# Patient Record
Sex: FEMALE | Race: BLACK OR AFRICAN AMERICAN | NOT HISPANIC OR LATINO | Employment: FULL TIME | RURAL
[De-identification: names, ages, dates, MRNs, and addresses within clinical notes are randomized per-mention and may not be internally consistent; named-entity substitution may affect disease eponyms.]

---

## 2018-07-14 NOTE — PROGRESS NOTES
Subjective   Loren Sharif is a 22 y.o. female.     Problem List  1. Obesity BMI >30   2. Former smoker   3. Exercise induced asthma   4. 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 She also works at [employer] as a      FamilyHx  -mother - Anemia, celiac disease  -father - unknown  -siblings- both anemic., ITP     Pt is 23 yo female who I am seeing for the first time. She is here to establish. She has seen another PCP in [place]. ABout 2 weeks ago she went to [facility] 2 weeks ago for a sore throat/pharyngitis was given a Z carl for 5 days. She is just now getting over her symptoms. She has subjective fevers and stiffneck. She had some swollen lymph nodes.  She did report smoking for a few weeks and only smoked 2 cigarettes a day. The only medication she takes is albuterol inhaler for exercise induced asthma.  She is not on singulair. She also has allergic rhinitis and was taking zyrtec before but did not like side effects. She occasional alcohol drinker, she denies any illiicit drugs. No children. She currently works at [employer] as a manager. She has family history of anemia, ceilaic disease, ITP. Her BMI >30.  She has tried losing weight.  She is due for pap smear. She cannot recall if she had the Gardasil vaccination. Her last Tdap was in 2012     Obesity   This is a chronic problem. The problem occurs constantly. The problem has been unchanged. Pertinent negatives include no abdominal pain, anorexia, arthralgias, change in bowel habit, chest pain, chills, congestion, coughing, diaphoresis, fatigue, fever, headaches, joint swelling, myalgias, nausea, neck pain, numbness, rash, sore throat, swollen glands, urinary symptoms, vertigo, visual change, vomiting or weakness. Nothing aggravates the symptoms. She has tried nothing for the symptoms. The treatment  "provided no relief.          The following portions of the patient's history were reviewed and updated as appropriate: allergies, current medications, past family history, past medical history, past social history, past surgical history and problem list.  Patient Active Problem List   Diagnosis   • Annual physical exam   • General medical examination   • Obesity   • Encounter for screening for endocrine disorder   • Encounter for screening for diabetes mellitus   • Encounter for screening for cardiovascular disorders   • Allergic rhinitis   • Encounter for Papanicolaou smear of cervix   • Asthma, exercise induced   • Viral warts     No current outpatient prescriptions on file prior to visit.     No current facility-administered medications on file prior to visit.        Review of Systems   Constitutional: Negative.  Negative for chills, diaphoresis, fatigue and fever.   HENT: Negative.  Negative for congestion and sore throat.    Eyes: Negative.    Respiratory: Negative.  Negative for cough.    Cardiovascular: Negative.  Negative for chest pain.   Gastrointestinal: Negative.  Negative for abdominal pain, anorexia, change in bowel habit, nausea and vomiting.   Endocrine: Negative.    Genitourinary: Negative.    Musculoskeletal: Negative.  Negative for arthralgias, joint swelling, myalgias and neck pain.   Skin: Negative.  Negative for rash.   Allergic/Immunologic: Positive for environmental allergies.   Neurological: Negative.  Negative for vertigo, weakness and numbness.   Hematological: Negative.    Psychiatric/Behavioral: Negative.        Objective    /74   Pulse 67   Temp 99.5 °F (37.5 °C)   Ht 170.2 cm (67\")   Wt 103 kg (227 lb 9.6 oz)   LMP 07/06/2018   SpO2 98%   BMI 35.65 kg/m²     No results found for any previous visit.       Physical Exam   Constitutional: She is oriented to person, place, and time. She appears well-developed and well-nourished. No distress.   HENT:   Head: Normocephalic and " atraumatic.   Right Ear: External ear normal.   Left Ear: External ear normal.   Mild erythema on pharynx. Pt has tonsils.  No white patches    Eyes: Conjunctivae and EOM are normal. Pupils are equal, round, and reactive to light. Right eye exhibits no discharge. Left eye exhibits no discharge. No scleral icterus.   Neck: Normal range of motion. Neck supple. No JVD present. No tracheal deviation present. No thyromegaly present.   Cardiovascular: Normal rate, regular rhythm, normal heart sounds and intact distal pulses.    Pulmonary/Chest: Effort normal. No stridor. No respiratory distress. She has no wheezes.   Abdominal: Soft. She exhibits no distension and no mass. There is no tenderness. There is no guarding.   Obese abdomen    Musculoskeletal: Normal range of motion. She exhibits no edema, tenderness or deformity.        Hands:  Lymphadenopathy:     She has no cervical adenopathy.   Neurological: She is alert and oriented to person, place, and time. She displays normal reflexes. No cranial nerve deficit. Coordination normal.   Skin: Skin is warm and dry. No rash noted. She is not diaphoretic. No erythema.   Psychiatric: She has a normal mood and affect. Her behavior is normal.   Nursing note and vitals reviewed.        Assessment/Plan   Problems Addressed this Visit        Respiratory    Allergic rhinitis    Asthma, exercise induced    Relevant Medications    albuterol (PROVENTIL HFA;VENTOLIN HFA) 108 (90 Base) MCG/ACT inhaler    montelukast (SINGULAIR) 5 MG chewable tablet       Digestive    Obesity    Relevant Orders    Vitamin D 25 Hydroxy       Musculoskeletal and Integument    Viral warts    Relevant Medications    Salicylic Acid 27.5 % liquid       Other    Annual physical exam    General medical examination - Primary    Relevant Orders    CBC Auto Differential    Comprehensive Metabolic Panel    Hemoglobin A1c    Lipid Panel    TSH    T4, Free    T3, Free    Vitamin D 25 Hydroxy    Encounter for screening  for endocrine disorder    Relevant Orders    TSH    T4, Free    T3, Free    Encounter for screening for diabetes mellitus    Relevant Orders    Hemoglobin A1c    Encounter for screening for cardiovascular disorders    Relevant Orders    Lipid Panel    Encounter for Papanicolaou smear of cervix    Relevant Orders    Ambulatory Referral to Obstetrics / Gynecology        - will get labwork including cbc, cmp, lipid panel, thyroid studies and vitamin D  -gave information on gardasil vaccination.. She will decide on next visit  -referral to [facility] for pap smear. Consultation appreciated  -annual physical exam today  -for exercise induced asthma - refilled inhaler. singulair at bedtime   -allergic rhinitis-  flonase PRN along with singulair 5 mg at bedtime. Drug information provided  -for wart on right index finger. Salicylic acid to apply to area twice a day for 4-6 week.s drug information provided  -advised pt to be safe and call with any questions or concerns. All questions addressed today  -obesity -weight loss information provided  -recheck in 2 weeks        This document has been electronically signed by Sandeep Rahman MD on July 16, 2018 2:12 PM

## 2018-07-16 ENCOUNTER — OFFICE VISIT (OUTPATIENT)
Dept: FAMILY MEDICINE CLINIC | Facility: CLINIC | Age: 23
End: 2018-07-16

## 2018-07-16 VITALS
DIASTOLIC BLOOD PRESSURE: 74 MMHG | BODY MASS INDEX: 35.72 KG/M2 | HEART RATE: 67 BPM | OXYGEN SATURATION: 98 % | TEMPERATURE: 99.5 F | SYSTOLIC BLOOD PRESSURE: 120 MMHG | HEIGHT: 67 IN | WEIGHT: 227.6 LBS

## 2018-07-16 DIAGNOSIS — Z13.6 ENCOUNTER FOR SCREENING FOR CARDIOVASCULAR DISORDERS: ICD-10-CM

## 2018-07-16 DIAGNOSIS — Z13.1 ENCOUNTER FOR SCREENING FOR DIABETES MELLITUS: ICD-10-CM

## 2018-07-16 DIAGNOSIS — E66.9 OBESITY, UNSPECIFIED CLASSIFICATION, UNSPECIFIED OBESITY TYPE, UNSPECIFIED WHETHER SERIOUS COMORBIDITY PRESENT: ICD-10-CM

## 2018-07-16 DIAGNOSIS — J30.9 ALLERGIC RHINITIS, UNSPECIFIED CHRONICITY, UNSPECIFIED SEASONALITY, UNSPECIFIED TRIGGER: ICD-10-CM

## 2018-07-16 DIAGNOSIS — Z12.4 ENCOUNTER FOR PAPANICOLAOU SMEAR OF CERVIX: ICD-10-CM

## 2018-07-16 DIAGNOSIS — B07.9 VIRAL WARTS, UNSPECIFIED TYPE: ICD-10-CM

## 2018-07-16 DIAGNOSIS — Z13.29 ENCOUNTER FOR SCREENING FOR ENDOCRINE DISORDER: ICD-10-CM

## 2018-07-16 DIAGNOSIS — J45.990 ASTHMA, EXERCISE INDUCED: ICD-10-CM

## 2018-07-16 DIAGNOSIS — Z00.00 GENERAL MEDICAL EXAMINATION: Primary | ICD-10-CM

## 2018-07-16 DIAGNOSIS — Z00.00 ANNUAL PHYSICAL EXAM: ICD-10-CM

## 2018-07-16 PROCEDURE — 99214 OFFICE O/P EST MOD 30 MIN: CPT | Performed by: FAMILY MEDICINE

## 2018-07-16 RX ORDER — FLUTICASONE PROPIONATE 50 MCG
2 SPRAY, SUSPENSION (ML) NASAL DAILY
Qty: 1 BOTTLE | Refills: 3 | Status: SHIPPED | OUTPATIENT
Start: 2018-07-16 | End: 2018-08-15

## 2018-07-16 RX ORDER — SALICYLIC ACID 275 MG/ML
1 SOLUTION TOPICAL 2 TIMES DAILY
Qty: 10 ML | Refills: 1 | Status: SHIPPED | OUTPATIENT
Start: 2018-07-16 | End: 2020-12-08

## 2018-07-16 RX ORDER — MONTELUKAST SODIUM 5 MG/1
5 TABLET, CHEWABLE ORAL NIGHTLY
Qty: 30 TABLET | Refills: 3 | Status: SHIPPED | OUTPATIENT
Start: 2018-07-16 | End: 2020-12-08

## 2018-07-16 RX ORDER — ALBUTEROL SULFATE 90 UG/1
2 AEROSOL, METERED RESPIRATORY (INHALATION) EVERY 4 HOURS
Qty: 1 INHALER | Refills: 3 | Status: SHIPPED | OUTPATIENT
Start: 2018-07-16 | End: 2020-12-08

## 2018-07-16 RX ORDER — ALBUTEROL SULFATE 90 UG/1
AEROSOL, METERED RESPIRATORY (INHALATION) EVERY 4 HOURS
COMMUNITY
End: 2018-07-16 | Stop reason: SDUPTHER

## 2018-07-16 NOTE — PATIENT INSTRUCTIONS
Exercising to Lose Weight  Exercising can help you to lose weight. In order to lose weight through exercise, you need to do vigorous-intensity exercise. You can tell that you are exercising with vigorous intensity if you are breathing very hard and fast and cannot hold a conversation while exercising.  Moderate-intensity exercise helps to maintain your current weight. You can tell that you are exercising at a moderate level if you have a higher heart rate and faster breathing, but you are still able to hold a conversation.  How often should I exercise?  Choose an activity that you enjoy and set realistic goals. Your health care provider can help you to make an activity plan that works for you. Exercise regularly as directed by your health care provider. This may include:  · Doing resistance training twice each week, such as:  ? Push-ups.  ? Sit-ups.  ? Lifting weights.  ? Using resistance bands.  · Doing a given intensity of exercise for a given amount of time. Choose from these options:  ? 150 minutes of moderate-intensity exercise every week.  ? 75 minutes of vigorous-intensity exercise every week.  ? A mix of moderate-intensity and vigorous-intensity exercise every week.    Children, pregnant women, people who are out of shape, people who are overweight, and older adults may need to consult a health care provider for individual recommendations. If you have any sort of medical condition, be sure to consult your health care provider before starting a new exercise program.  What are some activities that can help me to lose weight?  · Walking at a rate of at least 4.5 miles an hour.  · Jogging or running at a rate of 5 miles per hour.  · Biking at a rate of at least 10 miles per hour.  · Lap swimming.  · Roller-skating or in-line skating.  · Cross-country skiing.  · Vigorous competitive sports, such as football, basketball, and soccer.  · Jumping rope.  · Aerobic dancing.  How can I be more active in my day-to-day  activities?  · Use the stairs instead of the elevator.  · Take a walk during your lunch break.  · If you drive, park your car farther away from work or school.  · If you take public transportation, get off one stop early and walk the rest of the way.  · Make all of your phone calls while standing up and walking around.  · Get up, stretch, and walk around every 30 minutes throughout the day.  What guidelines should I follow while exercising?  · Do not exercise so much that you hurt yourself, feel dizzy, or get very short of breath.  · Consult your health care provider prior to starting a new exercise program.  · Wear comfortable clothes and shoes with good support.  · Drink plenty of water while you exercise to prevent dehydration or heat stroke. Body water is lost during exercise and must be replaced.  · Work out until you breathe faster and your heart beats faster.  This information is not intended to replace advice given to you by your health care provider. Make sure you discuss any questions you have with your health care provider.  Document Released: 01/20/2012 Document Revised: 05/25/2017 Document Reviewed: 05/21/2015  [employer] Interactive Patient Education © 2018 [employer]    Calorie Counting for Weight Loss  Calories are units of energy. Your body needs a certain amount of calories from food to keep you going throughout the day. When you eat more calories than your body needs, your body stores the extra calories as fat. When you eat fewer calories than your body needs, your body burns fat to get the energy it needs.  Calorie counting means keeping track of how many calories you eat and drink each day. Calorie counting can be helpful if you need to lose weight. If you make sure to eat fewer calories than your body needs, you should lose weight. Ask your health care provider what a healthy weight is for you.  For calorie counting to work, you will need to eat the right number of calories in a day in order  to lose a healthy amount of weight per week. A dietitian can help you determine how many calories you need in a day and will give you suggestions on how to reach your calorie goal.  · A healthy amount of weight to lose per week is usually 1-2 lb (0.5-0.9 kg). This usually means that your daily calorie intake should be reduced by 500-750 calories.  · Eating 1,200 - 1,500 calories per day can help most women lose weight.  · Eating 1,500 - 1,800 calories per day can help most men lose weight.    What is my plan?  My goal is to have __________ calories per day.  If I have this many calories per day, I should lose around __________ pounds per week.  What do I need to know about calorie counting?  In order to meet your daily calorie goal, you will need to:  · Find out how many calories are in each food you would like to eat. Try to do this before you eat.  · Decide how much of the food you plan to eat.  · Write down what you ate and how many calories it had. Doing this is called keeping a food log.    To successfully lose weight, it is important to balance calorie counting with a healthy lifestyle that includes regular activity. Aim for 150 minutes of moderate exercise (such as walking) or 75 minutes of vigorous exercise (such as running) each week.  Where do I find calorie information?    The number of calories in a food can be found on a Nutrition Facts label. If a food does not have a Nutrition Facts label, try to look up the calories online or ask your dietitian for help.  Remember that calories are listed per serving. If you choose to have more than one serving of a food, you will have to multiply the calories per serving by the amount of servings you plan to eat. For example, the label on a package of bread might say that a serving size is 1 slice and that there are 90 calories in a serving. If you eat 1 slice, you will have eaten 90 calories. If you eat 2 slices, you will have eaten 180 calories.  How do I keep a  "food log?  Immediately after each meal, record the following information in your food log:  · What you ate. Don't forget to include toppings, sauces, and other extras on the food.  · How much you ate. This can be measured in cups, ounces, or number of items.  · How many calories each food and drink had.  · The total number of calories in the meal.    Keep your food log near you, such as in a small notebook in your pocket, or use a mobile priyanka or website. Some programs will calculate calories for you and show you how many calories you have left for the day to meet your goal.  What are some calorie counting tips?  · Use your calories on foods and drinks that will fill you up and not leave you hungry:  ? Some examples of foods that fill you up are nuts and nut butters, vegetables, lean proteins, and high-fiber foods like whole grains. High-fiber foods are foods with more than 5 g fiber per serving.  ? Drinks such as sodas, specialty coffee drinks, alcohol, and juices have a lot of calories, yet do not fill you up.  · Eat nutritious foods and avoid empty calories. Empty calories are calories you get from foods or beverages that do not have many vitamins or protein, such as candy, sweets, and soda. It is better to have a nutritious high-calorie food (such as an avocado) than a food with few nutrients (such as a bag of chips).  · Know how many calories are in the foods you eat most often. This will help you calculate calorie counts faster.  · Pay attention to calories in drinks. Low-calorie drinks include water and unsweetened drinks.  · Pay attention to nutrition labels for \"low fat\" or \"fat free\" foods. These foods sometimes have the same amount of calories or more calories than the full fat versions. They also often have added sugar, starch, or salt, to make up for flavor that was removed with the fat.  · Find a way of tracking calories that works for you. Get creative. Try different apps or programs if writing down " calories does not work for you.  What are some portion control tips?  · Know how many calories are in a serving. This will help you know how many servings of a certain food you can have.  · Use a measuring cup to measure serving sizes. You could also try weighing out portions on a kitchen scale. With time, you will be able to estimate serving sizes for some foods.  · Take some time to put servings of different foods on your favorite plates, bowls, and cups so you know what a serving looks like.  · Try not to eat straight from a bag or box. Doing this can lead to overeating. Put the amount you would like to eat in a cup or on a plate to make sure you are eating the right portion.  · Use smaller plates, glasses, and bowls to prevent overeating.  · Try not to multitask (for example, watch TV or use your computer) while eating. If it is time to eat, sit down at a table and enjoy your food. This will help you to know when you are full. It will also help you to be aware of what you are eating and how much you are eating.  What are tips for following this plan?  Reading food labels  · Check the calorie count compared to the serving size. The serving size may be smaller than what you are used to eating.  · Check the source of the calories. Make sure the food you are eating is high in vitamins and protein and low in saturated and trans fats.  Shopping  · Read nutrition labels while you shop. This will help you make healthy decisions before you decide to purchase your food.  · Make a grocery list and stick to it.  Cooking  · Try to cook your favorite foods in a healthier way. For example, try baking instead of frying.  · Use low-fat dairy products.  Meal planning  · Use more fruits and vegetables. Half of your plate should be fruits and vegetables.  · Include lean proteins like poultry and fish.  How do I count calories when eating out?  · Ask for smaller portion sizes.  · Consider sharing an entree and sides instead of  getting your own entree.  · If you get your own entree, eat only half. Ask for a box at the beginning of your meal and put the rest of your entree in it so you are not tempted to eat it.  · If calories are listed on the menu, choose the lower calorie options.  · Choose dishes that include vegetables, fruits, whole grains, low-fat dairy products, and lean protein.  · Choose items that are boiled, broiled, grilled, or steamed. Stay away from items that are buttered, battered, fried, or served with cream sauce. Items labeled “crispy” are usually fried, unless stated otherwise.  · Choose water, low-fat milk, unsweetened iced tea, or other drinks without added sugar. If you want an alcoholic beverage, choose a lower calorie option such as a glass of wine or light beer.  · Ask for dressings, sauces, and syrups on the side. These are usually high in calories, so you should limit the amount you eat.  · If you want a salad, choose a garden salad and ask for grilled meats. Avoid extra toppings like jain, cheese, or fried items. Ask for the dressing on the side, or ask for olive oil and vinegar or lemon to use as dressing.  · Estimate how many servings of a food you are given. For example, a serving of cooked rice is ½ cup or about the size of half a baseball. Knowing serving sizes will help you be aware of how much food you are eating at restaurants. The list below tells you how big or small some common portion sizes are based on everyday objects:  ? 1 oz--4 stacked dice.  ? 3 oz--1 deck of cards.  ? 1 tsp--1 die.  ? 1 Tbsp--½ a ping-pong ball.  ? 2 Tbsp--1 ping-pong ball.  ? ½ cup--½ baseball.  ? 1 cup--1 baseball.  Summary  · Calorie counting means keeping track of how many calories you eat and drink each day. If you eat fewer calories than your body needs, you should lose weight.  · A healthy amount of weight to lose per week is usually 1-2 lb (0.5-0.9 kg). This usually means reducing your daily calorie intake by 500-750  calories.  · The number of calories in a food can be found on a Nutrition Facts label. If a food does not have a Nutrition Facts label, try to look up the calories online or ask your dietitian for help.  · Use your calories on foods and drinks that will fill you up, and not on foods and drinks that will leave you hungry.  · Use smaller plates, glasses, and bowls to prevent overeating.  This information is not intended to replace advice given to you by your health care provider. Make sure you discuss any questions you have with your health care provider.  Document Released: 12/18/2006 Document Revised: 11/17/2017 Document Reviewed: 11/17/2017  [employer] Interactive Patient Education © 2018 [employer]    Steps to Quit Smoking  Smoking tobacco can be harmful to your health and can affect almost every organ in your body. Smoking puts you, and those around you, at risk for developing many serious chronic diseases. Quitting smoking is difficult, but it is one of the best things that you can do for your health. It is never too late to quit.  What are the benefits of quitting smoking?  When you quit smoking, you lower your risk of developing serious diseases and conditions, such as:  · Lung cancer or lung disease, such as COPD.  · Heart disease.  · Stroke.  · Heart attack.  · Infertility.  · Osteoporosis and bone fractures.    Additionally, symptoms such as coughing, wheezing, and shortness of breath may get better when you quit. You may also find that you get sick less often because your body is stronger at fighting off colds and infections. If you are pregnant, quitting smoking can help to reduce your chances of having a baby of low birth weight.  How do I get ready to quit?  When you decide to quit smoking, create a plan to make sure that you are successful. Before you quit:  · Pick a date to quit. Set a date within the next two weeks to give you time to prepare.  · Write down the reasons why you are quitting. Keep  this list in places where you will see it often, such as on your bathroom mirror or in your car or wallet.  · Identify the people, places, things, and activities that make you want to smoke (triggers) and avoid them. Make sure to take these actions:  ? Throw away all cigarettes at home, at work, and in your car.  ? Throw away smoking accessories, such as ashtrays and lighters.  ? Clean your car and make sure to empty the ashtray.  ? Clean your home, including curtains and carpets.  · Tell your family, friends, and coworkers that you are quitting. Support from your loved ones can make quitting easier.  · Talk with your health care provider about your options for quitting smoking.  · Find out what treatment options are covered by your health insurance.    What strategies can I use to quit smoking?  Talk with your healthcare provider about different strategies to quit smoking. Some strategies include:  · Quitting smoking altogether instead of gradually lessening how much you smoke over a period of time. Research shows that quitting “cold turkey” is more successful than gradually quitting.  · Attending in-person counseling to help you build problem-solving skills. You are more likely to have success in quitting if you attend several counseling sessions. Even short sessions of 10 minutes can be effective.  · Finding resources and support systems that can help you to quit smoking and remain smoke-free after you quit. These resources are most helpful when you use them often. They can include:  ? Online chats with a counselor.  ? Telephone quitlines.  ? Printed self-help materials.  ? Support groups or group counseling.  ? Text messaging programs.  ? Mobile phone applications.  · Taking medicines to help you quit smoking. (If you are pregnant or breastfeeding, talk with your health care provider first.) Some medicines contain nicotine and some do not. Both types of medicines help with cravings, but the medicines that  include nicotine help to relieve withdrawal symptoms. Your health care provider may recommend:  ? Nicotine patches, gum, or lozenges.  ? Nicotine inhalers or sprays.  ? Non-nicotine medicine that is taken by mouth.    Talk with your health care provider about combining strategies, such as taking medicines while you are also receiving in-person counseling. Using these two strategies together makes you more likely to succeed in quitting than if you used either strategy on its own.  If you are pregnant or breastfeeding, talk with your health care provider about finding counseling or other support strategies to quit smoking. Do not take medicine to help you quit smoking unless told to do so by your health care provider.  What things can I do to make it easier to quit?  Quitting smoking might feel overwhelming at first, but there is a lot that you can do to make it easier. Take these important actions:  · Reach out to your family and friends and ask that they support and encourage you during this time. Call telephone quitlines, reach out to support groups, or work with a counselor for support.  · Ask people who smoke to avoid smoking around you.  · Avoid places that trigger you to smoke, such as bars, parties, or smoke-break areas at work.  · Spend time around people who do not smoke.  · Lessen stress in your life, because stress can be a smoking trigger for some people. To lessen stress, try:  ? Exercising regularly.  ? Deep-breathing exercises.  ? Yoga.  ? Meditating.  ? Performing a body scan. This involves closing your eyes, scanning your body from head to toe, and noticing which parts of your body are particularly tense. Purposefully relax the muscles in those areas.  · Download or purchase mobile phone or tablet apps (applications) that can help you stick to your quit plan by providing reminders, tips, and encouragement. There are many free apps, such as QuitGuide from the CDC (Centers for Disease Control and  Prevention). You can find other support for quitting smoking (smoking cessation) through smokefree.gov and other websites.    How will I feel when I quit smoking?  Within the first 24 hours of quitting smoking, you may start to feel some withdrawal symptoms. These symptoms are usually most noticeable 2-3 days after quitting, but they usually do not last beyond 2-3 weeks. Changes or symptoms that you might experience include:  · Mood swings.  · Restlessness, anxiety, or irritation.  · Difficulty concentrating.  · Dizziness.  · Strong cravings for sugary foods in addition to nicotine.  · Mild weight gain.  · Constipation.  · Nausea.  · Coughing or a sore throat.  · Changes in how your medicines work in your body.  · A depressed mood.  · Difficulty sleeping (insomnia).    After the first 2-3 weeks of quitting, you may start to notice more positive results, such as:  · Improved sense of smell and taste.  · Decreased coughing and sore throat.  · Slower heart rate.  · Lower blood pressure.  · Clearer skin.  · The ability to breathe more easily.  · Fewer sick days.    Quitting smoking is very challenging for most people. Do not get discouraged if you are not successful the first time. Some people need to make many attempts to quit before they achieve long-term success. Do your best to stick to your quit plan, and talk with your health care provider if you have any questions or concerns.  This information is not intended to replace advice given to you by your health care provider. Make sure you discuss any questions you have with your health care provider.  Document Released: 12/12/2002 Document Revised: 08/15/2017 Document Reviewed: 05/03/2016  [employer] Interactive Patient Education © 2018 [employer]  Human Papillomavirus Quadrivalent Vaccine suspension for injection  What is this medicine?  HUMAN PAPILLOMAVIRUS VACCINE (HYOO muhn pap [facility] vak SEEN) is a vaccine. It is used to prevent infections of four  types of the human papillomavirus. In women, the vaccine may lower your risk of getting cervical, vaginal, vulvar, or anal cancer and genital warts. In men, the vaccine may lower your risk of getting genital warts and anal cancer. You cannot get these diseases from the vaccine. This vaccine does not treat these diseases.  This medicine may be used for other purposes; ask your health care provider or pharmacist if you have questions.  COMMON BRAND NAME(S): Gardasil  What should I tell my health care provider before I take this medicine?  They need to know if you have any of these conditions:  -fever or infection  -hemophilia  -HIV infection or AIDS  -immune system problems  -low platelet count  -an unusual reaction to Human Papillomavirus Vaccine, yeast, other medicines, foods, dyes, or preservatives  -pregnant or trying to get pregnant  -breast-feeding  How should I use this medicine?  This vaccine is for injection in a muscle on your upper arm or thigh. It is given by a health care professional. You will be observed for 15 minutes after each dose. Sometimes, fainting happens after the vaccine is given. You may be asked to sit or lie down during the 15 minutes. Three doses are given. The second dose is given 2 months after the first dose. The last dose is given 4 months after the second dose.  A copy of a Vaccine Information Statement will be given before each vaccination. Read this sheet carefully each time. The sheet may change frequently.  Talk to your pediatrician regarding the use of this medicine in children. While this drug may be prescribed for children as young as 9 years of age for selected conditions, precautions do apply.  Overdosage: If you think you have taken too much of this medicine contact a poison control center or emergency room at once.  NOTE: This medicine is only for you. Do not share this medicine with others.  What if I miss a dose?  All 3 doses of the vaccine should be given within 6  months. Remember to keep appointments for follow-up doses. Your health care provider will tell you when to return for the next vaccine. Ask your health care professional for advice if you are unable to keep an appointment or miss a scheduled dose.  What may interact with this medicine?  -other vaccines  This list may not describe all possible interactions. Give your health care provider a list of all the medicines, herbs, non-prescription drugs, or dietary supplements you use. Also tell them if you smoke, drink alcohol, or use illegal drugs. Some items may interact with your medicine.  What should I watch for while using this medicine?  This vaccine may not fully protect everyone. Continue to have regular pelvic exams and cervical or anal cancer screenings as directed by your doctor.  The Human Papillomavirus is a sexually transmitted disease. It can be passed by any kind of sexual activity that involves genital contact. The vaccine works best when given before you have any contact with the virus. Many people who have the virus do not have any signs or symptoms.  Tell your doctor or health care professional if you have any reaction or unusual symptom after getting the vaccine.  What side effects may I notice from receiving this medicine?  Side effects that you should report to your doctor or health care professional as soon as possible:  -allergic reactions like skin rash, itching or hives, swelling of the face, lips, or tongue  -breathing problems  -feeling faint or lightheaded, falls  Side effects that usually do not require medical attention (report to your doctor or health care professional if they continue or are bothersome):  -cough  -dizziness  -fever  -headache  -nausea  -redness, warmth, swelling, pain, or itching at site where injected  This list may not describe all possible side effects. Call your doctor for medical advice about side effects. You may report side effects to FDA at 5-479-FDA-5748.  Where  should I keep my medicine?  This drug is given in a hospital or clinic and will not be stored at home.  NOTE: This sheet is a summary. It may not cover all possible information. If you have questions about this medicine, talk to your doctor, pharmacist, or health care provider.  © 2018 [employer] (2015-02-09 13:14:33)  HPV Vaccine Gardasil® (Human Papillomavirus): What You Need to Know  1. What is HPV?  Genital human papillomavirus (HPV) is the most common sexually transmitted virus in the [place]. More than half of sexually active men and women are infected with HPV at some time in their lives.  About 20 million Americans are currently infected, and about 6 million more get infected each year. HPV is usually spread through sexual contact.  Most HPV infections don't cause any symptoms, and go away on their own. But HPV can cause cervical cancer in women. Cervical cancer is the 2nd leading cause of cancer deaths among women around the world. In the [place], about 12,000 women get cervical cancer every year and about 4,000 are expected to die from it.  HPV is also associated with several less common cancers, such as vaginal and vulvar cancers in women, and anal and oropharyngeal (back of the throat, including base of tongue and tonsils) cancers in both men and women. HPV can also cause genital warts and warts in the throat.  There is no cure for HPV infection, but some of the problems it causes can be treated.  2. HPV vaccine: Why get vaccinated?  The HPV vaccine you are getting is one of two vaccines that can be given to prevent HPV. It may be given to both males and females.   This vaccine can prevent most cases of cervical cancer in females, if it is given before exposure to the virus. In addition, it can prevent vaginal and vulvar cancer in females, and genital warts and anal cancer in both males and females.  Protection from HPV vaccine is expected to be long-lasting. But vaccination is  not a substitute for cervical cancer screening. Women should still get regular Pap tests.  3. Who should get this HPV vaccine and when?  HPV vaccine is given as a 3-dose series  · 1st Dose: Now  · 2nd Dose: 1 to 2 months after Dose 1  · 3rd Dose: 6 months after Dose 1  Additional (booster) doses are not recommended.  Routine vaccination  · This HPV vaccine is recommended for girls and boys 11 or 12 years of age. It may be given starting at age 9.  Why is HPV vaccine recommended at 11 or 12 years of age?   HPV infection is easily acquired, even with only one sex partner. That is why it is important to get HPV vaccine before any sexual contact takes place. Also, response to the vaccine is better at this age than at older ages.  Catch-up vaccination  This vaccine is recommended for the following people who have not completed the 3-dose series:   · Females 13 through 26 years of age.  · Males 13 through 21 years of age.  This vaccine may be given to men 22 through 26 years of age who have not completed the 3-dose series.  It is recommended for men through age 26 who have sex with men or whose immune system is weakened because of HIV infection, other illness, or medications.   HPV vaccine may be given at the same time as other vaccines.  4. Some people should not get HPV vaccine or should wait.  · Anyone who has ever had a life-threatening allergic reaction to any component of HPV vaccine, or to a previous dose of HPV vaccine, should not get the vaccine. Tell your doctor if the person getting vaccinated has any severe allergies, including an allergy to yeast.  · HPV vaccine is not recommended for pregnant women. However, receiving HPV vaccine when pregnant is not a reason to consider terminating the pregnancy. Women who are breast feeding may get the vaccine.  · People who are mildly ill when a dose of HPV is planned can still be vaccinated. People with a moderate or severe illness should wait until they are better.  5.  What are the risks from this vaccine?  This HPV vaccine has been used in the [place] and around the world for about six years and has been very safe.  However, any medicine could possibly cause a serious problem, such as a severe allergic reaction. The risk of any vaccine causing a serious injury, or death, is extremely small.  Life-threatening allergic reactions from vaccines are very rare. If they do occur, it would be within a few minutes to a few hours after the vaccination.  Several mild to moderate problems are known to occur with this HPV vaccine. These do not last long and go away on their own.  · Reactions in the arm where the shot was given:    Pain (about 8 people in 10)    Redness or swelling (about 1 person in 4)  · Fever:    Mild (100° F) (about 1 person in 10)    Moderate (102° F) (about 1 person in 65)  · Other problems:    Headache (about 1 person in 3)  · Fainting: Brief fainting spells and related symptoms (such as jerking movements) can happen after any medical procedure, including vaccination. Sitting or lying down for about 15 minutes after a vaccination can help prevent fainting and injuries caused by falls. Tell your doctor if the patient feels dizzy or light-headed, or has vision changes or ringing in the ears.  · Like all vaccines, HPV vaccines will continue to be monitored for unusual or severe problems.  6. What if there is a serious reaction?  What should I look for?  · Look for anything that concerns you, such as signs of a severe allergic reaction, very high fever, or behavior changes.  Signs of a severe allergic reaction can include hives, swelling of the face and throat, difficulty breathing, a fast heartbeat, dizziness, and weakness. These would start a few minutes to a few hours after the vaccination.   What should I do?  · If you think it is a severe allergic reaction or other emergency that can't wait, call 9-1-1 or get the person to the nearest hospital. Otherwise, call your  doctor.  · Afterward, the reaction should be reported to the Vaccine Adverse Event Reporting System (VAERS). Your doctor might file this report, or you can do it yourself through the VAERS web site at www.vaers.hhs.gov, or by calling 1-472.999.4098.  VAERS is only for reporting reactions. They do not give medical advice.  7. The National Vaccine Injury Compensation Program  · The National Vaccine Injury Compensation Program (VICP) is a federal program that was created to compensate people who may have been injured by certain vaccines.  · Persons who believe they may have been injured by a vaccine can learn about the program and about filing a claim by calling 1-284.779.1651 or visiting the VICP website at www.hrsa.gov/vaccinecompensation.  8. How can I learn more?  · Ask your doctor.  · Call your local or state health department.  · Contact the Centers for Disease Control and Prevention (CDC):    Call 1-747.997.2441 (7-545-CUE-INFO)  or    Visit CDC's website at www.cdc.gov/vaccines  CDC Human Papillomavirus (HPV) Gardasil® (Interim) 5/17/13     This information is not intended to replace advice given to you by your health care provider. Make sure you discuss any questions you have with your health care provider.     Document Released: 10/15/2007 Document Revised: 01/08/2016 Document Reviewed: 01/29/2015  [employer] Interactive Patient Education ©2017 [employer]  Montelukast oral tablets  What is this medicine?  MONTELUKAST (mon te LOO kast) is used to prevent and treat the symptoms of asthma. It is also used to treat allergies. Do not use for an acute asthma attack.  This medicine may be used for other purposes; ask your health care provider or pharmacist if you have questions.  COMMON BRAND NAME(S): Singulair  What should I tell my health care provider before I take this medicine?  They need to know if you have any of these conditions:  -liver disease  -an unusual or allergic reaction to montelukast, other  medicines, foods, dyes, or preservatives  -pregnant or trying to get pregnant  -breast-feeding  How should I use this medicine?  This medicine should be given by mouth. Follow the directions on the prescription label. Take this medicine at the same time every day. You may take this medicine with or without meals. Do not chew the tablets. Do not stop taking your medicine unless your doctor tells you to.  Talk to your pediatrician regarding the use of this medicine in children. Special care may be needed. While this drug may be prescribed for children as young as 15 years of age for selected conditions, precautions do apply.  Overdosage: If you think you have taken too much of this medicine contact a poison control center or emergency room at once.  NOTE: This medicine is only for you. Do not share this medicine with others.  What if I miss a dose?  If you miss a dose, take it as soon as you can. If it is almost time for your next dose, take only that dose. Do not take double or extra doses.  What may interact with this medicine?  -anti-infectives like rifampin and rifabutin  -medicines for diabetes like rosiglitazone and repaglinide  -medicines for seizures like phenytoin, phenobarbital, and carbamazepine  -paclitaxel  This list may not describe all possible interactions. Give your health care provider a list of all the medicines, herbs, non-prescription drugs, or dietary supplements you use. Also tell them if you smoke, drink alcohol, or use illegal drugs. Some items may interact with your medicine.  What should I watch for while using this medicine?  Visit your doctor or health care professional for regular checks on your progress. Tell your doctor or health care professional if your allergy or asthma symptoms do not improve. Take your medicine even when you do not have symptoms. Do not stop taking any of your medicine(s) unless your doctor tells you to.  If you have asthma, talk to your doctor about what to do in  an acute asthma attack. Always have your inhaled rescue medicine for asthma attacks with you.  Patients and their families should watch for new or worsening thoughts of suicide or depression. Also watch for sudden changes in feelings such as feeling anxious, agitated, panicky, irritable, hostile, aggressive, impulsive, severely restless, overly excited and hyperactive, or not being able to sleep. Any worsening of mood or thoughts of suicide or dying should be reported to your health care professional right away.  What side effects may I notice from receiving this medicine?  Side effects that you should report to your doctor or health care professional as soon as possible:  -allergic reactions like skin rash or hives, or swelling of the face, lips, or tongue  -breathing problems  -confusion  -dark urine  -fever or infection  -flu-like symptoms  -hallucinations  -painful lumps under the skin  -pain, tingling, numbness in the hands or feet  -sinus pain or swelling  -suicidal thoughts or other mood changes  -tremors  -trouble sleeping  -uncontrolled muscle movements  -unusual bleeding or bruising  -yellowing of the eyes or skin  Side effects that usually do not require medical attention (report to your doctor or health care professional if they continue or are bothersome):  -cough  -dizziness  -drowsiness  -headache  -nightmares  -stomach upset  -stuffy nose  This list may not describe all possible side effects. Call your doctor for medical advice about side effects. You may report side effects to FDA at 9-662-FDA-8074.  Where should I keep my medicine?  Keep out of the reach of children.  Store at room temperature between 15 and 30 degrees C (59 and 86 degrees F). Protect from light and moisture. Keep this medicine in the original bottle. Throw away any unused medicine after the expiration date.  NOTE: This sheet is a summary. It may not cover all possible information. If you have questions about this medicine, talk to  your doctor, pharmacist, or health care provider.  © 2018 [employer] (2016-12-19 09:40:44)  Fluticasone nasal spray  What is this medicine?  FLUTICASONE (floo TIK a sone) is a corticosteroid. This medicine is used to treat the symptoms of allergies like sneezing, itchy red eyes, and itchy, runny, or stuffy nose. This medicine is also used to treat nasal polyps.  This medicine may be used for other purposes; ask your health care provider or pharmacist if you have questions.  COMMON BRAND NAME(S): Flonase, Flonase Allergy Relief, Flonase Sensimist, Veramyst, XHANCE  What should I tell my health care provider before I take this medicine?  They need to know if you have any of these conditions:  -cataracts  -glaucoma  -infection, like tuberculosis, herpes, or fungal infection  -recent surgery on nose or sinuses  -taking a corticosteroid by mouth  -an unusual or allergic reaction to fluticasone, steroids, other medicines, foods, dyes, or preservatives  -pregnant or trying to get pregnant  -breast-feeding  How should I use this medicine?  This medicine is for use in the nose. Follow the directions on your product or prescription label. This medicine works best if used at regular intervals. Do not use more often than directed. Make sure that you are using your nasal spray correctly. After 6 months of daily use for allergies, talk to your doctor or health care professional before using it for a longer time. Ask your doctor or health care professional if you have any questions.  Talk to your pediatrician regarding the use of this medicine in children. Special care may be needed. Some products have been used for allergies in children as young as 2 years. After 2 months of daily use without a prescription in a child, talk to your pediatrician before using it for a longer time. Use of this medicine for nasal polyps is not approved in children.  Overdosage: If you think you have taken too much of this medicine contact  a poison control center or emergency room at once.  NOTE: This medicine is only for you. Do not share this medicine with others.  What if I miss a dose?  If you miss a dose, use it as soon as you remember. If it is almost time for your next dose, use only that dose and continue with your regular schedule. Do not use double or extra doses.  What may interact with this medicine?  -certain antibiotics like clarithromycin and telithromycin  -certain medicines for fungal infections like ketoconazole, itraconazole, and voriconazole  -conivaptan  -nefazodone  -some medicines for HIV  -vaccines  This list may not describe all possible interactions. Give your health care provider a list of all the medicines, herbs, non-prescription drugs, or dietary supplements you use. Also tell them if you smoke, drink alcohol, or use illegal drugs. Some items may interact with your medicine.  What should I watch for while using this medicine?  Visit your doctor or health care professional for regular checks on your progress. Some symptoms may improve within 12 hours after starting use. Check with your doctor or health care professional if there is no improvement in your symptoms after 3 weeks of use.  This medicine may increase your risk of getting an infection. Tell your doctor or health care professional if you are around anyone with measles or chickenpox, or if you develop sores or blisters that do not heal properly.  What side effects may I notice from receiving this medicine?  Side effects that you should report to your doctor or health care professional as soon as possible:  -allergic reactions like skin rash, itching or hives, swelling of the face, lips, or tongue  -changes in vision  -crusting or sores in the nose  -nosebleed  -signs and symptoms of infection like fever or chills; cough; sore throat  -white patches or sores in the mouth or nose  Side effects that usually do not require medical attention (report to your doctor or  health care professional if they continue or are bothersome):  -burning or irritation inside the nose or throat  -cough  -headache  -unusual taste or smell  This list may not describe all possible side effects. Call your doctor for medical advice about side effects. You may report side effects to FDA at 6-253-CWF-3590.  Where should I keep my medicine?  Keep out of the reach of children.  Store at room temperature between 15 and 30 degrees C (59 and 86 degrees F). Avoid exposure to extreme heat, cold, or light. Throw away any unused medicine after the expiration date.  NOTE: This sheet is a summary. It may not cover all possible information. If you have questions about this medicine, talk to your doctor, pharmacist, or health care provider.  © 2018 [employer] (2017-09-29 14:23:12)    High-Fiber Diet  Fiber, also called dietary fiber, is a type of carbohydrate found in fruits, vegetables, whole grains, and beans. A high-fiber diet can have many health benefits. Your health care provider may recommend a high-fiber diet to help:  · Prevent constipation. Fiber can make your bowel movements more regular.  · Lower your cholesterol.  · Relieve hemorrhoids, uncomplicated diverticulosis, or irritable bowel syndrome.  · Prevent overeating as part of a weight-loss plan.  · Prevent heart disease, type 2 diabetes, and certain cancers.    What is my plan?  The recommended daily intake of fiber includes:  · 38 grams for men under age 50.  · 30 grams for men over age 50.  · 25 grams for women under age 50.  · 21 grams for women over age 50.    You can get the recommended daily intake of dietary fiber by eating a variety of fruits, vegetables, grains, and beans. Your health care provider may also recommend a fiber supplement if it is not possible to get enough fiber through your diet.  What do I need to know about a high-fiber diet?  · Fiber supplements have not been widely studied for their effectiveness, so it is  better to get fiber through food sources.  · Always check the fiber content on the nutrition facts label of any prepackaged food. Look for foods that contain at least 5 grams of fiber per serving.  · Ask your dietitian if you have questions about specific foods that are related to your condition, especially if those foods are not listed in the following section.  · Increase your daily fiber consumption gradually. Increasing your intake of dietary fiber too quickly may cause bloating, cramping, or gas.  · Drink plenty of water. Water helps you to digest fiber.  What foods can I eat?  Grains  Whole-grain breads. Multigrain cereal. Oats and oatmeal. Brown rice. Barley. Bulgur wheat. Millet. Bran muffins. Popcorn. Rye wafer crackers.  Vegetables  Sweet potatoes. Spinach. Kale. Artichokes. Cabbage. Broccoli. Green peas. Carrots. Squash.  Fruits  Berries. Pears. Apples. Oranges. Avocados. Prunes and raisins. Dried figs.  Meats and Other Protein Sources  Navy, kidney, rivera, and soy beans. Split peas. Lentils. Nuts and seeds.  Dairy  Fiber-fortified yogurt.  Beverages  Fiber-fortified soy milk. Fiber-fortified orange juice.  Other  Fiber bars.  The items listed above may not be a complete list of recommended foods or beverages. Contact your dietitian for more options.  What foods are not recommended?  Grains  White bread. Pasta made with refined flour. White rice.  Vegetables  Fried potatoes. Canned vegetables. Well-cooked vegetables.  Fruits  Fruit juice. Cooked, strained fruit.  Meats and Other Protein Sources  Fatty cuts of meat. Fried poultry or fried fish.  Dairy  Milk. Yogurt. Cream cheese. Sour cream.  Beverages  Soft drinks.  Other  Cakes and pastries. Butter and oils.  The items listed above may not be a complete list of foods and beverages to avoid. Contact your dietitian for more information.  What are some tips for including high-fiber foods in my diet?  · Eat a wide variety of high-fiber foods.  · Make sure  that half of all grains consumed each day are whole grains.  · Replace breads and cereals made from refined flour or white flour with whole-grain breads and cereals.  · Replace white rice with brown rice, bulgur wheat, or millet.  · Start the day with a breakfast that is high in fiber, such as a cereal that contains at least 5 grams of fiber per serving.  · Use beans in place of meat in soups, salads, or pasta.  · Eat high-fiber snacks, such as berries, raw vegetables, nuts, or popcorn.  This information is not intended to replace advice given to you by your health care provider. Make sure you discuss any questions you have with your health care provider.  Document Released: 12/18/2006 Document Revised: 05/25/2017 Document Reviewed: 06/02/2015  [employer] Interactive Patient Education © 2018 [employer]  Salicylic Acid topical gel, cream, lotion, solution  What is this medicine?  SALICYCLIC ACID (SAL i NNAMDI ik AS id) breaks down layers of thick skin. It is used to treat common and plantar warts, psoriasis, calluses, and corns. It is also used to treat or to prevent acne.  This medicine may be used for other purposes; ask your health care provider or pharmacist if you have questions.  COMMON BRAND NAME(S): Akurza, Clear Away Liquid, Clearasil Total Control, Clearasil Ultra Scrub, Compound W, Corn/Callus Remover, Dermarest Psoriasis Moisturizer, Dermarest Psoriasis Overnight Treatment, Dermarest Psoriasis Scalp Treatment, Dermarest Psoriasis Skin Treatment, DuoFilm Wart Remover, Gordofilm, Hydrisalic, Keralyt, MOSCO Callus & Corn Remover, Neutrogena Acne Wash, Occlusal-HP, RE SA, SalAC, Salactic Film, Salacyn, Salex, Salimez, Salimez Forte, Salisol, Salisol Forte, Salitech, Salitech Forte, Salitop, Scalpicin 2 in 1 Anti-Dandruff, UltraSal-ER, VIRASAL, Wart-Off, XALIX  What should I tell my health care provider before I take this medicine?  They need to know if you have any of these conditions:  -child with chickenpox,  the flu, or other viral infection  -kidney disease  -liver disease  -an unusual or allergic reaction to salicylic acid, other medicines, foods, dyes, or preservatives  -pregnant or trying to get pregnant  -breast-feeding  How should I use this medicine?  This medicine is for external use only. Follow the directions on the label. Do not apply to raw or irritated skin. Avoid getting medicine in your eyes, lips, nose, mouth, or other sensitive areas. Use this medicine at regular intervals. Do not use more often than directed.  Talk to your pediatrician regarding the use of this medicine in children. Special care may be needed. This medicine is not approved for use in children under 2 years old.  Overdosage: If you think you have taken too much of this medicine contact a poison control center or emergency room at once.  NOTE: This medicine is only for you. Do not share this medicine with others.  What if I miss a dose?  If you miss a dose, use it as soon as you can. If it is almost time for your next dose, use only that dose. Do not use double or extra doses.  What may interact with this medicine?  -medicines that change urine pH like ammonium chloride, sodium bicarbonate, and others  -medicines that treat or prevent blood clots like warfarin  -methotrexate  -pyrazinamide  -some medicines for diabetes  -some medicines for gout  -steroid medicines like prednisone or cortisone  This list may not describe all possible interactions. Give your health care provider a list of all the medicines, herbs, non-prescription drugs, or dietary supplements you use. Also tell them if you smoke, drink alcohol, or use illegal drugs. Some items may interact with your medicine.  What should I watch for while using this medicine?  Tell your doctor is your symptoms do not get better or if they get worse.  This medicine can make you more sensitive to the sun. Keep out of the sun. If you cannot avoid being in the sun, wear protective clothing  and use sunscreen. Do not use sun lamps or tanning beds/booths.  Use of this medicine in children under 12 years or in patients with kidney or liver disease may increase the risk of serious side effects. These patients should not use this medicine over large areas of skin. If you notice symptoms such as nausea, vomiting, dizziness, loss of hearing, ringing in the ears, unusual weakness or tiredness, fast or labored breathing, diarrhea, or confusion, stop using this medicine and contact your doctor or health care professional.  What side effects may I notice from receiving this medicine?  Side effects that you should report to your doctor or health care professional as soon as possible:  -allergic reactions like skin rash, itching or hives, swelling of the face, lips, or tongue  Side effects that usually do not require medical attention (report to your doctor or health care professional if they continue or are bothersome):  -skin irritation  This list may not describe all possible side effects. Call your doctor for medical advice about side effects. You may report side effects to FDA at 3-535-FDA-4709.  Where should I keep my medicine?  Keep out of the reach of children.  Store at room temperature between 15 and 30 degrees C (59 and 86 degrees F). Do not freeze. Throw away any unused medicine after the expiration date.  NOTE: This sheet is a summary. It may not cover all possible information. If you have questions about this medicine, talk to your doctor, pharmacist, or health care provider.  © 2018 Elsevier/Gold Standard (2009-08-21 13:36:20)    Warts  Warts are small growths on the skin. They are common and can occur on various areas of the body. A person may have one wart or multiple warts. Most warts are not painful, and they usually do not cause problems. However, warts can cause pain if they are large or occur in an area of the body where pressure will be applied to them, such as the bottom of the foot.  In  many cases, warts do not require treatment. They usually go away on their own over a period of many months to a couple years. Various treatments may be done for warts that cause problems or do not go away. Sometimes, warts go away and then come back again.  What are the causes?  Warts are caused by a type of virus that is called human papillomavirus (HPV). This virus can spread from person to person through direct contact. Warts can also spread to other areas of the body when a person scratches a wart and then scratches another area of his or her body.  What increases the risk?  Warts are more likely to develop in:  · People who are 10-20 years of age.  · People who have a weakened body defense system (immune system).    What are the signs or symptoms?  A wart may be round or oval or have an irregular shape. Most warts have a rough surface. Warts may range in color from skin color to light yellow, brown, or gray. They are generally less than ½ inch (1.3 cm) in size. Most warts are painless, but some can be painful when pressure is applied to them.  How is this diagnosed?  A wart can usually be diagnosed from its appearance. In some cases, a tissue sample may be removed (biopsy) to be looked at under a microscope.  How is this treated?  In many cases, warts do not need treatment. If treatment is needed, options may include:  · Applying medicated solutions, creams, or patches to the wart. These may be over-the-counter or prescription medicines that make the skin soft so that layers will gradually shed away. In many cases, the medicine is applied one or two times per day and covered with a bandage.  · Putting duct tape over the top of the wart (occlusion). You will leave the tape in place for as long as told by your health care provider, then you will replace it with a new strip of tape. This is done until the wart goes away.  · Freezing the wart with liquid nitrogen (cryotherapy).  · Burning the wart with:  ? Laser  treatment.  ? An electrified probe (electrocautery).  · Injection of a medicine (Candida antigen) into the wart to help the body's immune system to fight off the wart.  · Surgery to remove the wart.    Follow these instructions at home:  · Apply over-the-counter and prescription medicines only as told by your health care provider.  · Do not apply over-the-counter wart medicines to your face or genitals before you ask your health care provider if it is okay to do so.  · Do not scratch or pick at a wart.  · Wash your hands after you touch a wart.  · Avoid shaving hair that is over a wart.  · Keep all follow-up visits as told by your health care provider. This is important.  Contact a health care provider if:  · Your warts do not improve after treatment.  · You have redness, swelling, or pain at the site of a wart.  · You have bleeding from a wart that does not stop with light pressure.  · You have diabetes and you develop a wart.  This information is not intended to replace advice given to you by your health care provider. Make sure you discuss any questions you have with your health care provider.  Document Released: 09/27/2006 Document Revised: 05/31/2017 Document Reviewed: 03/14/2016  [employer] Interactive Patient Education © 2018 [employer]

## 2018-07-23 LAB
25(OH)D3 SERPL-MCNC: 19.1 NG/ML (ref 30–100)
ALBUMIN SERPL-MCNC: 4.4 G/DL (ref 3.4–4.8)
ALBUMIN/GLOB SERPL: 1.3 G/DL (ref 1.1–1.8)
ALP SERPL-CCNC: 72 U/L (ref 38–126)
ALT SERPL W P-5'-P-CCNC: 28 U/L (ref 9–52)
ANION GAP SERPL CALCULATED.3IONS-SCNC: 9 MMOL/L (ref 5–15)
ARTICHOKE IGE QN: 59 MG/DL (ref 1–129)
AST SERPL-CCNC: 26 U/L (ref 14–36)
BASOPHILS # BLD AUTO: 0.02 10*3/MM3 (ref 0–0.2)
BASOPHILS NFR BLD AUTO: 0.4 % (ref 0–2)
BILIRUB SERPL-MCNC: 0.5 MG/DL (ref 0.2–1.3)
BUN BLD-MCNC: 7 MG/DL (ref 7–21)
BUN/CREAT SERPL: 13 (ref 7–25)
CALCIUM SPEC-SCNC: 9.4 MG/DL (ref 8.4–10.2)
CHLORIDE SERPL-SCNC: 102 MMOL/L (ref 95–110)
CHOLEST SERPL-MCNC: 142 MG/DL (ref 0–199)
CO2 SERPL-SCNC: 27 MMOL/L (ref 22–31)
CREAT BLD-MCNC: 0.54 MG/DL (ref 0.5–1)
DEPRECATED RDW RBC AUTO: 40 FL (ref 36.4–46.3)
EOSINOPHIL # BLD AUTO: 0.2 10*3/MM3 (ref 0–0.7)
EOSINOPHIL NFR BLD AUTO: 3.8 % (ref 0–7)
ERYTHROCYTE [DISTWIDTH] IN BLOOD BY AUTOMATED COUNT: 12.5 % (ref 11.5–14.5)
GFR SERPL CREATININE-BSD FRML MDRD: 173 ML/MIN/1.73 (ref 71–165)
GLOBULIN UR ELPH-MCNC: 3.3 GM/DL (ref 2.3–3.5)
GLUCOSE BLD-MCNC: 93 MG/DL (ref 60–100)
HBA1C MFR BLD: 5.8 % (ref 4–5.6)
HCT VFR BLD AUTO: 40.3 % (ref 35–45)
HDLC SERPL-MCNC: 58 MG/DL (ref 60–200)
HGB BLD-MCNC: 13.2 G/DL (ref 12–15.5)
IMM GRANULOCYTES # BLD: 0.01 10*3/MM3 (ref 0–0.02)
IMM GRANULOCYTES NFR BLD: 0.2 % (ref 0–0.5)
LDLC/HDLC SERPL: 1.16 {RATIO} (ref 0–3.22)
LYMPHOCYTES # BLD AUTO: 2.07 10*3/MM3 (ref 0.6–4.2)
LYMPHOCYTES NFR BLD AUTO: 39.4 % (ref 10–50)
MCH RBC QN AUTO: 28.5 PG (ref 26.5–34)
MCHC RBC AUTO-ENTMCNC: 32.8 G/DL (ref 31.4–36)
MCV RBC AUTO: 87 FL (ref 80–98)
MONOCYTES # BLD AUTO: 0.58 10*3/MM3 (ref 0–0.9)
MONOCYTES NFR BLD AUTO: 11 % (ref 0–12)
NEUTROPHILS # BLD AUTO: 2.37 10*3/MM3 (ref 2–8.6)
NEUTROPHILS NFR BLD AUTO: 45.2 % (ref 37–80)
PLATELET # BLD AUTO: 383 10*3/MM3 (ref 150–450)
PMV BLD AUTO: 8.9 FL (ref 8–12)
POTASSIUM BLD-SCNC: 5.5 MMOL/L (ref 3.5–5.1)
PROT SERPL-MCNC: 7.7 G/DL (ref 6.3–8.6)
RBC # BLD AUTO: 4.63 10*6/MM3 (ref 3.77–5.16)
SODIUM BLD-SCNC: 138 MMOL/L (ref 137–145)
T4 FREE SERPL-MCNC: 1.01 NG/DL (ref 0.78–2.19)
TRIGL SERPL-MCNC: 85 MG/DL (ref 20–199)
TSH SERPL DL<=0.05 MIU/L-ACNC: 1.61 MIU/ML (ref 0.46–4.68)
WBC NRBC COR # BLD: 5.25 10*3/MM3 (ref 3.2–9.8)

## 2018-07-23 PROCEDURE — 85025 COMPLETE CBC W/AUTO DIFF WBC: CPT | Performed by: FAMILY MEDICINE

## 2018-07-23 PROCEDURE — 84443 ASSAY THYROID STIM HORMONE: CPT | Performed by: FAMILY MEDICINE

## 2018-07-23 PROCEDURE — 83036 HEMOGLOBIN GLYCOSYLATED A1C: CPT | Performed by: FAMILY MEDICINE

## 2018-07-23 PROCEDURE — 82306 VITAMIN D 25 HYDROXY: CPT | Performed by: FAMILY MEDICINE

## 2018-07-23 PROCEDURE — 84439 ASSAY OF FREE THYROXINE: CPT | Performed by: FAMILY MEDICINE

## 2018-07-23 PROCEDURE — 80061 LIPID PANEL: CPT | Performed by: FAMILY MEDICINE

## 2018-07-23 PROCEDURE — 84481 FREE ASSAY (FT-3): CPT | Performed by: FAMILY MEDICINE

## 2018-07-23 PROCEDURE — 80053 COMPREHEN METABOLIC PANEL: CPT | Performed by: FAMILY MEDICINE

## 2018-07-24 LAB — T3FREE SERPL-MCNC: 3.6 PG/ML (ref 2–4.4)

## 2018-07-24 RX ORDER — CHOLECALCIFEROL (VITAMIN D3) 1250 MCG
50000 CAPSULE ORAL
Qty: 4 CAPSULE | Refills: 3 | Status: SHIPPED | OUTPATIENT
Start: 2018-07-24 | End: 2019-01-14 | Stop reason: SDUPTHER

## 2018-07-29 NOTE — PROGRESS NOTES
Subjective   Loren Sharif is a 22 y.o. female.     Problem List  1. Obesity BMI >30   2. Former smoker   3. Exercise induced asthma   4. Allergic rhinitis   5. Vitamin D deficiency   6. Prediabetes   7. Dysilpidemia ASCVD Risk low   8. 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 She is here to establish. She has seen another PCP in [place]. ABout 2 weeks ago she went to [facility] 2 weeks ago for a sore throat/pharyngitis was given a Z carl for 5 days. She is just now getting over her symptoms. She has subjective fevers and stiffneck. She had some swollen lymph nodes.  She did report smoking for a few weeks and only smoked 2 cigarettes a day. The only medication she takes is albuterol inhaler for exercise induced asthma.  She is not on singulair. She also has allergic rhinitis and was taking zyrtec before but did not like side effects. She occasional alcohol drinker, she denies any illiicit drugs. No children. She currently works at [employer] as a manager. She has family history of anemia, ceilaic disease, ITP. Her BMI >30.  She has tried losing weight.  She is due for pap smear. She cannot recall if she had the Gardasil vaccination. Her last Tdap was in 2012 7/30/18 Pt is here for followup and recheck. Since last visit pt had labwork that shoed normal Thyroid studies. She did have Vitamin D deficiency and pt was started on Vitamin D pill once a week. She is also prediabetic and hga1c at 5.8.   She also on lipid panel had normal LDL and total cholesterol but HDL was low. ASCVD risk is low. On CMP  She did have high potassium at 5.5. She is due for new recheck. CBC was  normal. Liver enzymes and kidney function was normal. Weight today is 229 lbs and it is 227. She has tried to eat healthy and is more health conscience    Obesity   This is a chronic problem. The problem occurs constantly. The problem has been unchanged. Pertinent negatives include no abdominal pain, anorexia, arthralgias, change in bowel habit, chest pain, chills, congestion, coughing, diaphoresis, fatigue, fever, headaches, joint swelling, myalgias, nausea, neck pain, numbness, rash, sore throat, swollen glands, urinary symptoms, vertigo, visual change, vomiting or weakness. Nothing aggravates the symptoms. She has tried nothing for the symptoms. The treatment provided no relief.          The following portions of the patient's history were reviewed and updated as appropriate: allergies, current medications, past family history, past medical history, past social history, past surgical history and problem list.  Patient Active Problem List   Diagnosis   • Annual physical exam   • General medical examination   • Obesity   • Encounter for screening for endocrine disorder   • Encounter for screening for diabetes mellitus   • Encounter for screening for cardiovascular disorders   • Allergic rhinitis   • Encounter for Papanicolaou smear of cervix   • Asthma, exercise induced   • Viral warts   • Prediabetes   • Vitamin D deficiency   • Hyperkalemia     Current Outpatient Prescriptions on File Prior to Visit   Medication Sig Dispense Refill   • albuterol (PROVENTIL HFA;VENTOLIN HFA) 108 (90 Base) MCG/ACT inhaler Inhale 2 puffs Every 4 (Four) Hours. 1 inhaler 3   • Cholecalciferol (VITAMIN D3) 51547 units capsule Take 1 capsule by mouth Every 7 (Seven) Days. 4 capsule 3   • fluticasone (FLONASE) 50 MCG/ACT nasal spray 2 sprays into each nostril Daily for 30 days. 1 bottle 3   • montelukast (SINGULAIR) 5 MG chewable tablet Chew 1 tablet Every Night. 30 tablet 3   • Salicylic Acid 27.5 % liquid Apply 1 application topically  "2 (Two) Times a Day. Apply for 4-6 weeks 10 mL 1     No current facility-administered medications on file prior to visit.        Review of Systems   Constitutional: Negative.  Negative for chills, diaphoresis, fatigue and fever.   HENT: Negative.  Negative for congestion and sore throat.    Eyes: Negative.    Respiratory: Negative.  Negative for cough.    Cardiovascular: Negative.  Negative for chest pain.   Gastrointestinal: Negative.  Negative for abdominal pain, anorexia, change in bowel habit, nausea and vomiting.   Endocrine: Negative.    Genitourinary: Negative.    Musculoskeletal: Negative.  Negative for arthralgias, joint swelling, myalgias and neck pain.   Skin: Negative.  Negative for rash.   Allergic/Immunologic: Positive for environmental allergies.   Neurological: Negative.  Negative for vertigo, weakness and numbness.   Hematological: Negative.    Psychiatric/Behavioral: Negative.        Objective    /72   Pulse 64   Temp 98.6 °F (37 °C)   Ht 170.2 cm (67\")   Wt 104 kg (229 lb)   LMP 07/06/2018   SpO2 98%   BMI 35.87 kg/m²     Office Visit on 07/16/2018   Component Date Value Ref Range Status   • WBC 07/23/2018 5.25  3.20 - 9.80 10*3/mm3 Final   • RBC 07/23/2018 4.63  3.77 - 5.16 10*6/mm3 Final   • Hemoglobin 07/23/2018 13.2  12.0 - 15.5 g/dL Final   • Hematocrit 07/23/2018 40.3  35.0 - 45.0 % Final   • MCV 07/23/2018 87.0  80.0 - 98.0 fL Final   • MCH 07/23/2018 28.5  26.5 - 34.0 pg Final   • MCHC 07/23/2018 32.8  31.4 - 36.0 g/dL Final   • RDW 07/23/2018 12.5  11.5 - 14.5 % Final   • RDW-SD 07/23/2018 40.0  36.4 - 46.3 fl Final   • MPV 07/23/2018 8.9  8.0 - 12.0 fL Final   • Platelets 07/23/2018 383  150 - 450 10*3/mm3 Final   • Neutrophil % 07/23/2018 45.2  37.0 - 80.0 % Final   • Lymphocyte % 07/23/2018 39.4  10.0 - 50.0 % Final   • Monocyte % 07/23/2018 11.0  0.0 - 12.0 % Final   • Eosinophil % 07/23/2018 3.8  0.0 - 7.0 % Final   • Basophil % 07/23/2018 0.4  0.0 - 2.0 % Final   • " Immature Grans % 07/23/2018 0.2  0.0 - 0.5 % Final   • Neutrophils, Absolute 07/23/2018 2.37  2.00 - 8.60 10*3/mm3 Final   • Lymphocytes, Absolute 07/23/2018 2.07  0.60 - 4.20 10*3/mm3 Final   • Monocytes, Absolute 07/23/2018 0.58  0.00 - 0.90 10*3/mm3 Final   • Eosinophils, Absolute 07/23/2018 0.20  0.00 - 0.70 10*3/mm3 Final   • Basophils, Absolute 07/23/2018 0.02  0.00 - 0.20 10*3/mm3 Final   • Immature Grans, Absolute 07/23/2018 0.01  0.00 - 0.02 10*3/mm3 Final   • Glucose 07/23/2018 93  60 - 100 mg/dL Final   • BUN 07/23/2018 7  7 - 21 mg/dL Final   • Creatinine 07/23/2018 0.54  0.50 - 1.00 mg/dL Final   • Sodium 07/23/2018 138  137 - 145 mmol/L Final   • Potassium 07/23/2018 5.5* 3.5 - 5.1 mmol/L Final   • Chloride 07/23/2018 102  95 - 110 mmol/L Final   • CO2 07/23/2018 27.0  22.0 - 31.0 mmol/L Final   • Calcium 07/23/2018 9.4  8.4 - 10.2 mg/dL Final   • Total Protein 07/23/2018 7.7  6.3 - 8.6 g/dL Final   • Albumin 07/23/2018 4.40  3.40 - 4.80 g/dL Final   • ALT (SGPT) 07/23/2018 28  9 - 52 U/L Final   • AST (SGOT) 07/23/2018 26  14 - 36 U/L Final   • Alkaline Phosphatase 07/23/2018 72  38 - 126 U/L Final   • Total Bilirubin 07/23/2018 0.5  0.2 - 1.3 mg/dL Final   • eGFR   Amer 07/23/2018 173* 71 - 165 mL/min/1.73 Final   • Globulin 07/23/2018 3.3  2.3 - 3.5 gm/dL Final   • A/G Ratio 07/23/2018 1.3  1.1 - 1.8 g/dL Final   • BUN/Creatinine Ratio 07/23/2018 13.0  7.0 - 25.0 Final   • Anion Gap 07/23/2018 9.0  5.0 - 15.0 mmol/L Final   • Hemoglobin A1C 07/23/2018 5.8* 4 - 5.6 % Final   • Total Cholesterol 07/23/2018 142  0 - 199 mg/dL Final   • Triglycerides 07/23/2018 85  20 - 199 mg/dL Final   • HDL Cholesterol 07/23/2018 58* 60 - 200 mg/dL Final   • LDL Cholesterol  07/23/2018 59  1 - 129 mg/dL Final   • LDL/HDL Ratio 07/23/2018 1.16  0.00 - 3.22 Final   • TSH 07/23/2018 1.610  0.460 - 4.680 mIU/mL Final   • Free T4 07/23/2018 1.01  0.78 - 2.19 ng/dL Final   • T3, Free 07/23/2018 3.6  2.0 - 4.4 pg/mL  Final   • 25 Hydroxy, Vitamin D 07/23/2018 19.1* 30.0 - 100.0 ng/ml Final       Physical Exam   Constitutional: She is oriented to person, place, and time. She appears well-developed and well-nourished. No distress.   HENT:   Head: Normocephalic and atraumatic.   Right Ear: External ear normal.   Left Ear: External ear normal.   Mild erythema on pharynx. Pt has tonsils.  No white patches    Eyes: Pupils are equal, round, and reactive to light. Conjunctivae and EOM are normal. Right eye exhibits no discharge. Left eye exhibits no discharge. No scleral icterus.   Neck: Normal range of motion. Neck supple. No JVD present. No tracheal deviation present. No thyromegaly present.   Cardiovascular: Normal rate, regular rhythm, normal heart sounds and intact distal pulses.    Pulmonary/Chest: Effort normal. No stridor. No respiratory distress. She has no wheezes.   Abdominal: Soft. She exhibits no distension and no mass. There is no tenderness. There is no guarding.   Obese abdomen    Musculoskeletal: Normal range of motion. She exhibits no edema, tenderness or deformity.        Hands:  Lymphadenopathy:     She has no cervical adenopathy.   Neurological: She is alert and oriented to person, place, and time. She displays normal reflexes. No cranial nerve deficit. Coordination normal.   Skin: Skin is warm and dry. No rash noted. She is not diaphoretic. No erythema.   Psychiatric: She has a normal mood and affect. Her behavior is normal.   Nursing note and vitals reviewed.        Assessment/Plan   Problems Addressed this Visit        Respiratory    Allergic rhinitis    Asthma, exercise induced       Digestive    Obesity - Primary    Vitamin D deficiency       Other    Prediabetes    Hyperkalemia        -prediabetes - hga1c at 5.8.  Gave prediabetes and prevention of DM type 2 tips  -dyslipdiemia-  ASCVD risk low. Recommend DASH diet high  Fiber diet.    - vitamin D deficiency - gave information on Vitamin D to go home with.  Vitamin D once aw [place]   -gave information on gardasil vaccination.. She states she did get vaccination when she was young but developed a severe reaction. Will hold for now. Advised pt to get pap smear   -referral to KAP[facility] for pap smear. Consultation appreciated. Has appt in September 2018   -annual physical exam last visit   -hyperkalemia -gave information about hyperkalemia today. Advised pt to refrain from high potassium foods. Consider kayexalate if not improved   -for exercise induced asthma - refilled inhaler. singulair at bedtime   -allergic rhinitis-  flonase PRN along with singulair 5 mg at bedtime. Drug information provided  -for wart on right index finger. Salicylic acid to apply to area twice a day for 4-6 week.s drug information provided  -advised pt to be safe and call with any questions or concerns. All questions addressed today  -obesity -weight loss information provided  -recheck in 1-2 months         This document has been electronically signed by Sandeep Rahman MD on July 30, 2018 10:06 AM

## 2018-07-30 ENCOUNTER — OFFICE VISIT (OUTPATIENT)
Dept: FAMILY MEDICINE CLINIC | Facility: CLINIC | Age: 23
End: 2018-07-30

## 2018-07-30 VITALS
WEIGHT: 229 LBS | TEMPERATURE: 98.6 F | OXYGEN SATURATION: 98 % | SYSTOLIC BLOOD PRESSURE: 130 MMHG | DIASTOLIC BLOOD PRESSURE: 72 MMHG | BODY MASS INDEX: 35.94 KG/M2 | HEIGHT: 67 IN | HEART RATE: 64 BPM

## 2018-07-30 DIAGNOSIS — J30.9 ALLERGIC RHINITIS, UNSPECIFIED CHRONICITY, UNSPECIFIED SEASONALITY, UNSPECIFIED TRIGGER: ICD-10-CM

## 2018-07-30 DIAGNOSIS — E87.5 HYPERKALEMIA: ICD-10-CM

## 2018-07-30 DIAGNOSIS — J45.990 ASTHMA, EXERCISE INDUCED: ICD-10-CM

## 2018-07-30 DIAGNOSIS — R73.03 PREDIABETES: ICD-10-CM

## 2018-07-30 DIAGNOSIS — E66.9 OBESITY, UNSPECIFIED CLASSIFICATION, UNSPECIFIED OBESITY TYPE, UNSPECIFIED WHETHER SERIOUS COMORBIDITY PRESENT: Primary | ICD-10-CM

## 2018-07-30 DIAGNOSIS — E55.9 VITAMIN D DEFICIENCY: ICD-10-CM

## 2018-07-30 LAB — POTASSIUM BLD-SCNC: 5.4 MMOL/L (ref 3.5–5.1)

## 2018-07-30 PROCEDURE — 99214 OFFICE O/P EST MOD 30 MIN: CPT | Performed by: FAMILY MEDICINE

## 2018-07-30 PROCEDURE — 84132 ASSAY OF SERUM POTASSIUM: CPT | Performed by: FAMILY MEDICINE

## 2018-07-30 NOTE — PATIENT INSTRUCTIONS
Hyperkalemia  Hyperkalemia is when you have too much potassium in your blood. Potassium is normally removed (excreted) from your body by your kidneys. If there is too much potassium in your blood, it can affect your heart’s ability to function.  What are the causes?  Hyperkalemia may be caused by:  · Taking in too much potassium. You can do this by:  ? Using salt substitutes. They contain large amounts of potassium.  ? Taking potassium supplements.  ? Eating foods high in potassium.  · Excreting too little potassium. This can happen if:  ? Your kidneys are not working properly. Kidney (renal) disease, including short- or long-term renal failure, is a very common cause of hyperkalemia.  ? You are taking medicines that lower your excretion of potassium.  ? You have Humphreys disease.  ? You have a urinary tract blockage, such as kidney stones.  ? You are on treatment to mechanically clean your blood (dialysis) and you skip a treatment.  · Releasing a high amount of potassium from your cells into your blood. This can happen with:  ? Injury to muscles (rhabdomyolysis) or other tissues. Most potassium is stored in your muscles.  ? Severe burns or infections.  ? Acidic blood plasma (acidosis). Acidosis can result from many diseases, such as uncontrolled diabetes.    What increases the risk?  The most common risk factor of hyperkalemia is kidney disease. Other risk factors of hyperkalemia include:  · Shawn disease. This is a condition where your glands do not produce enough hormones.  · Alcoholism or heavy drug use.  · Using certain blood pressure medicines, such as angiotensin-converting enzyme (ACE) inhibitors, angiotensin II receptor blockers (ARBs), or potassium-sparing diuretics such as spironolactone.  · Severe injury or burn.    What are the signs or symptoms?  Oftentimes, there are no signs or symptoms of hyperkalemia. However, when your potassium level becomes high enough, you may experience symptoms such  as:  · Irregular or very slow heartbeat.  · Nausea.  · Fatigue.  · Tingling of the skin or numbness of the hands or feet.  · Muscle weakness.  · Fatigue.  · Not being able to move (paralysis).    You may not have any symptoms of hyperkalemia.  How is this diagnosed?  Hyperkalemia may be diagnosed by:  · Physical exam.  · Blood tests.  · ECG (electrocardiogram).  · Discussion of prescription and non-prescription drug use.    How is this treated?  Treatment for hyperkalemia is often directed at the underlying cause. In some instances, treatment may include:  · Insulin.  · Glucose (sugar) and water solution given through a vein (intravenous or IV ).  · Dialysis.  · Medicines to remove the potassium from your body.  · Medicines to move calcium from your bloodstream into your tissues.    Follow these instructions at home:  · Take medicines only as directed by your health care provider.  · Do not take any supplements, natural products, herbs, or vitamins without reviewing them with your health care provider. Certain supplements and natural food products can have high amounts of potassium.  · Limit your alcohol intake as directed by your health care provider.  · Stop illegal drug use. If you need help quitting, ask your health care provider.  · Keep all follow-up visits as directed by your health care provider. This is important.  · If you have kidney disease, you may need to follow a low potassium diet. A dietitian can help educate you on low potassium foods.  Contact a health care provider if:  · You notice an irregular or very slow heartbeat.  · You feel light-headed.  · You feel weak.  · You are nauseous.  · You have tingling or numbness in your hands or feet.  Get help right away if:  · You have shortness of breath.  · You have chest pain or discomfort.  · You pass out.  · You have muscle paralysis.  This information is not intended to replace advice given to you by your health care provider. Make sure you discuss any  questions you have with your health care provider.  Document Released: 12/08/2003 Document Revised: 05/25/2017 Document Reviewed: 03/25/2015  [employer] Interactive Patient Education © 2018 [employer]    Preventing Type 2 Diabetes Mellitus  Type 2 diabetes (type 2 diabetes mellitus) is a long-term (chronic) disease that affects blood sugar (glucose) levels. Normally, a hormone called insulin allows glucose to enter cells in the body. The cells use glucose for energy. In type 2 diabetes, one or both of these problems may be present:  · The body does not make enough insulin.  · The body does not respond properly to insulin that it makes (insulin resistance).    Insulin resistance or lack of insulin causes excess glucose to build up in the blood instead of going into cells. As a result, high blood glucose (hyperglycemia) develops, which can cause many complications. Being overweight or obese and having an inactive (sedentary) lifestyle can increase your risk for diabetes. Type 2 diabetes can be delayed or prevented by making certain nutrition and lifestyle changes.  What nutrition changes can be made?  · Eat healthy meals and snacks regularly. Keep a healthy snack with you for when you get hungry between meals, such as fruit or a handful of nuts.  · Eat lean meats and proteins that are low in saturated fats, such as chicken, fish, egg whites, and beans. Avoid processed meats.  · Eat plenty of fruits and vegetables and plenty of grains that have not been processed (whole grains). It is recommended that you eat:  ? 1½?2 cups of fruit every day.  ? 2½?3 cups of vegetables every day.  ? 6?8 oz of whole grains every day, such as oats, whole wheat, bulgur, brown rice, quinoa, and millet.  · Eat low-fat dairy products, such as milk, yogurt, and cheese.  · Eat foods that contain healthy fats, such as nuts, avocado, olive oil, and canola oil.  · Drink water throughout the day. Avoid drinks that contain added sugar, such as  soda or sweet tea.  · Follow instructions from your health care provider about specific eating or drinking restrictions.  · Control how much food you eat at a time (portion size).  ? Check food labels to find out the serving sizes of foods.  ? Use a kitchen scale to weigh amounts of foods.  · Saute or steam food instead of frying it. Cook with water or broth instead of oils or butter.  · Limit your intake of:  ? Salt (sodium). Have no more than 1 tsp (2,400 mg) of sodium a day. If you have heart disease or high blood pressure, have less than ½?¾ tsp (1,500 mg) of sodium a day.  ? Saturated fat. This is fat that is solid at room temperature, such as butter or fat on meat.  What lifestyle changes can be made?    Activity  · Do moderate-intensity physical activity for at least 30 minutes on at least 5 days of the week, or as much as told by your health care provider.  · Ask your health care provider what activities are safe for you. A mix of physical activities may be best, such as walking, swimming, cycling, and strength training.  · Try to add physical activity into your day. For example:  ? Park in spots that are farther away than usual, so that you walk more. For example, park in a far corner of the parking lot when you go to the office or the grocery store.  ? Take a walk during your lunch break.  ? Use stairs instead of elevators or escalators.  Weight Loss  · Lose weight as directed. Your health care provider can determine how much weight loss is best for you and can help you lose weight safely.  · If you are overweight or obese, you may be instructed to lose at least 5?7 % of your body weight.  Alcohol and Tobacco    · Limit alcohol intake to no more than 1 drink a day for nonpregnant women and 2 drinks a day for men. One drink equals 12 oz of beer, 5 oz of wine, or 1½ oz of hard liquor.  · Do not use any tobacco products, such as cigarettes, chewing tobacco, and e-cigarettes. If you need help quitting, ask  your health care provider.  Work With Your Health Care Provider  · Have your blood glucose tested regularly, as told by your health care provider.  · Discuss your risk factors and how you can reduce your risk for diabetes.  · Get screening tests as told by your health care provider. You may have screening tests regularly, especially if you have certain risk factors for type 2 diabetes.  · Make an appointment with a diet and nutrition specialist (registered dietitian). A registered dietitian can help you make a healthy eating plan and can help you understand portion sizes and food labels.  Why are these changes important?  · It is possible to prevent or delay type 2 diabetes and related health problems by making lifestyle and nutrition changes.  · It can be difficult to recognize signs of type 2 diabetes. The best way to avoid possible damage to your body is to take actions to prevent the disease before you develop symptoms.  What can happen if changes are not made?  · Your blood glucose levels may keep increasing. Having high blood glucose for a long time is dangerous. Too much glucose in your blood can damage your blood vessels, heart, kidneys, nerves, and eyes.  · You may develop prediabetes or type 2 diabetes. Type 2 diabetes can lead to many chronic health problems and complications, such as:  ? Heart disease.  ? Stroke.  ? Blindness.  ? Kidney disease.  ? Depression.  ? Poor circulation in the feet and legs, which could lead to surgical removal (amputation) in severe cases.  Where to find support:  · Ask your health care provider to recommend a registered dietitian, diabetes educator, or weight loss program.  · Look for local or online weight loss groups.  · Join a gym, fitness club, or outdoor activity group, such as a walking club.  Where to find more information:  To learn more about diabetes and diabetes prevention, visit:  · American Diabetes Association (ADA): www.diabetes.org  · National [place] of  Diabetes and Digestive and Kidney Diseases: www.niddk.nih.gov/health-information/diabetes    To learn more about healthy eating, visit:  · The U.S. Department of Agriculture (USDA), Choose My Plate: www.makemojimyplate.gov/food-groups  · Office of Disease Prevention and Health Promotion (ODPHP), Dietary Guidelines: www.health.gov/dietaryguidelines    Summary  · You can reduce your risk for type 2 diabetes by increasing your physical activity, eating healthy foods, and losing weight as directed.  · Talk with your health care provider about your risk for type 2 diabetes. Ask about any blood tests or screening tests that you need to have.  This information is not intended to replace advice given to you by your health care provider. Make sure you discuss any questions you have with your health care provider.  Document Released: 04/10/2017 Document Revised: 05/25/2017 Document Reviewed: 02/07/2017  [employer] Interactive Patient Education © 2018 [employer]  Prediabetes Eating Plan  Prediabetes--also called impaired glucose tolerance or impaired fasting glucose--is a condition that causes blood sugar (blood glucose) levels to be higher than normal. Following a healthy diet can help to keep prediabetes under control. It can also help to lower the risk of type 2 diabetes and heart disease, which are increased in people who have prediabetes. Along with regular exercise, a healthy diet:  · Promotes weight loss.  · Helps to control blood sugar levels.  · Helps to improve the way that the body uses insulin.    What do I need to know about this eating plan?  · Use the glycemic index (GI) to plan your meals. The index tells you how quickly a food will raise your blood sugar. Choose low-GI foods. These foods take a longer time to raise blood sugar.  · Pay close attention to the amount of carbohydrates in the food that you eat. Carbohydrates increase blood sugar levels.  · Keep track of how many calories you take in. Eating the  right amount of calories will help you to achieve a healthy weight. Losing about 7 percent of your starting weight can help to prevent type 2 diabetes.  · You may want to follow a Mediterranean diet. This diet includes a lot of vegetables, lean meats or fish, whole grains, fruits, and healthy oils and fats.  What foods can I eat?  Grains  Whole grains, such as whole-wheat or whole-grain breads, crackers, cereals, and pasta. Unsweetened oatmeal. Bulgur. Barley. Quinoa. Brown rice. Corn or whole-wheat flour tortillas or taco shells.  Vegetables  Lettuce. Spinach. Peas. Beets. Cauliflower. Cabbage. Broccoli. Carrots. Tomatoes. Squash. Eggplant. Herbs. Peppers. Onions. Cucumbers. [place] sprouts.  Fruits  Berries. Bananas. Apples. Oranges. Grapes. Papaya. [place]. Pomegranate. Kiwi. Grapefruit. Cherries.  Meats and Other Protein Sources  Seafood. Lean meats, such as chicken and turkey or lean cuts of pork and beef. Tofu. Eggs. Nuts. Beans.  Dairy  Low-fat or fat-free dairy products, such as yogurt, cottage cheese, and cheese.  Beverages  Water. Tea. Coffee. Sugar-free or diet soda. [place] water. Milk. Milk alternatives, such as soy or almond milk.  Condiments  Mustard. Relish. Low-fat, low-sugar ketchup. Low-fat, low-sugar barbecue sauce. Low-fat or fat-free mayonnaise.  Sweets and Desserts  Sugar-free or low-fat pudding. Sugar-free or low-fat ice cream and other frozen treats.  Fats and Oils  Avocado. Walnuts. Olive oil.  The items listed above may not be a complete list of recommended foods or beverages. Contact your dietitian for more options.  What foods are not recommended?  Grains  Refined white flour and flour products, such as bread, pasta, snack foods, and cereals.  Beverages  Sweetened drinks, such as sweet iced tea and soda.  Sweets and Desserts  Baked goods, such as cake, cupcakes, pastries, cookies, and cheesecake.  The items listed above may not be a complete list of foods and beverages to avoid. Contact  your dietitian for more information.  This information is not intended to replace advice given to you by your health care provider. Make sure you discuss any questions you have with your health care provider.  Document Released: 05/03/2016 Document Revised: 05/25/2017 Document Reviewed: 01/13/2016  [employer] Interactive Patient Education © 2017 [employer]    Prediabetes  Prediabetes is the condition of having a blood sugar (blood glucose) level that is higher than it should be, but not high enough for you to be diagnosed with type 2 diabetes. Having prediabetes puts you at risk for developing type 2 diabetes (type 2 diabetes mellitus). Prediabetes may be called impaired glucose tolerance or impaired fasting glucose.  Prediabetes usually does not cause symptoms. Your health care provider can diagnose this condition with blood tests. You may be tested for prediabetes if you are overweight and if you have at least one other risk factor for prediabetes.  Risk factors for prediabetes include:  · Having a family member with type 2 diabetes.  · Being overweight or obese.  · Being older than age 45.  · Being of American-, -American, /, or / descent.  · Having an inactive (sedentary) lifestyle.  · Having a history of gestational diabetes or polycystic ovarian syndrome (PCOS).  · Having low levels of good cholesterol (HDL-C) or high levels of blood fats (triglycerides).  · Having high blood pressure.    What is blood glucose and how is blood glucose measured?    Blood glucose refers to the amount of glucose in your bloodstream. Glucose comes from eating foods that contain sugars and starches (carbohydrates) that the body breaks down into glucose. Your blood glucose level may be measured in mg/dL (milligrams per deciliter) or mmol/L (millimoles per liter). Your blood glucose may be checked with one or more of the following blood tests:  · A fasting blood glucose (FBG) test. You  will not be allowed to eat (you will fast) for at least 8 hours before a blood sample is taken.  ? A normal range for FBG is  mg/dl (3.9-5.6 mmol/L).  · An A1c (hemoglobin A1c) blood test. This test provides information about blood glucose control over the previous 2?3 months.  · An oral glucose tolerance test (OGTT). This test measures your blood glucose twice:  ? After fasting. This is your baseline level.  ? Two hours after you drink a beverage that contains glucose.    You may be diagnosed with prediabetes:  · If your FBG is 100?125 mg/dL (5.6-6.9 mmol/L).  · If your A1c level is 5.7?6.4%.  · If your OGGT result is 140?199 mg/dL (7.8-11 mmol/L).    These blood tests may be repeated to confirm your diagnosis.  What happens if blood glucose is too high?  The pancreas produces a hormone (insulin) that helps move glucose from the bloodstream into cells. When cells in the body do not respond properly to insulin that the body makes (insulin resistance), excess glucose builds up in the blood instead of going into cells. As a result, high blood glucose (hyperglycemia) can develop, which can cause many complications. This is a symptom of prediabetes.  What can happen if blood glucose stays higher than normal for a long time?  Having high blood glucose for a long time is dangerous. Too much glucose in your blood can damage your nerves and blood vessels. Long-term damage can lead to complications from diabetes, which may include:  · Heart disease.  · Stroke.  · Blindness.  · Kidney disease.  · Depression.  · Poor circulation in the feet and legs, which could lead to surgical removal (amputation) in severe cases.    How can prediabetes be prevented from turning into type 2 diabetes?    To help prevent type 2 diabetes, take the following actions:  · Be physically active.  ? Do moderate-intensity physical activity for at least 30 minutes on at least 5 days of the week, or as much as told by your health care provider.  This could be brisk walking, biking, or water aerobics.  ? Ask your health care provider what activities are safe for you. A mix of physical activities may be best, such as walking, swimming, cycling, and strength training.  · Lose weight as told by your health care provider.  ? Losing 5-7% of your body weight can reverse insulin resistance.  ? Your health care provider can determine how much weight loss is best for you and can help you lose weight safely.  · Follow a healthy meal plan. This includes eating lean proteins, complex carbohydrates, fresh fruits and vegetables, low-fat dairy products, and healthy fats.  ? Follow instructions from your health care provider about eating or drinking restrictions.  ? Make an appointment to see a diet and nutrition specialist (registered dietitian) to help you create a healthy eating plan that is right for you.  · Do not smoke or use any tobacco products, such as cigarettes, chewing tobacco, and e-cigarettes. If you need help quitting, ask your health care provider.  · Take over-the-counter and prescription medicines as told by your health care provider. You may be prescribed medicines that help lower the risk of type 2 diabetes.    This information is not intended to replace advice given to you by your health care provider. Make sure you discuss any questions you have with your health care provider.  Document Released: 04/10/2017 Document Revised: 05/25/2017 Document Reviewed: 02/07/2017  [employer] Interactive Patient Education © 2018 [employer]    Vitamin D Deficiency  Vitamin D deficiency is when your body does not have enough vitamin D. Vitamin D is important to your body for many reasons:  · It helps the body to absorb two important minerals, called calcium and phosphorus.  · It plays a role in bone health.  · It may help to prevent some diseases, such as diabetes and multiple sclerosis.  · It plays a role in muscle function, including heart function.    You can get  vitamin D by:  · Eating foods that naturally contain vitamin D.  · Eating or drinking milk or other dairy products that have vitamin D added to them.  · Taking a vitamin D supplement or a multivitamin supplement that contains vitamin D.  · Being in the sun. Your body naturally makes vitamin D when your skin is exposed to sunlight. Your body changes the sunlight into a form of the vitamin that the body can use.    If vitamin D deficiency is severe, it can cause a condition in which your bones become soft. In adults, this condition is called osteomalacia. In children, this condition is called rickets.  What are the causes?  Vitamin D deficiency may be caused by:  · Not eating enough foods that contain vitamin D.  · Not getting enough sun exposure.  · Having certain digestive system diseases that make it difficult for your body to absorb vitamin D. These diseases include Crohn disease, chronic pancreatitis, and cystic fibrosis.  · Having a surgery in which a part of the stomach or a part of the small intestine is removed.  · Being obese.  · Having chronic kidney disease or liver disease.    What increases the risk?  This condition is more likely to develop in:  · Older people.  · People who do not spend much time outdoors.  · People who live in a long-term care facility.  · People who have had broken bones.  · People with weak or thin bones (osteoporosis).  · People who have a disease or condition that changes how the body absorbs vitamin D.  · People who have dark skin.  · People who take certain medicines, such as steroid medicines or certain seizure medicines.  · People who are overweight or obese.    What are the signs or symptoms?  In mild cases of vitamin D deficiency, there may not be any symptoms. If the condition is severe, symptoms may include:  · Bone pain.  · Muscle pain.  · Falling often.  · Broken bones caused by a minor injury.    How is this diagnosed?  This condition is usually diagnosed with a blood  test.  How is this treated?  Treatment for this condition may depend on what caused the condition. Treatment options include:  · Taking vitamin D supplements.  · Taking a calcium supplement. Your health care provider will suggest what dose is best for you.    Follow these instructions at home:  · Take medicines and supplements only as told by your health care provider.  · Eat foods that contain vitamin D. Choices include:  ? Fortified dairy products, cereals, or juices. Fortified means that vitamin D has been added to the food. Check the label on the package to be sure.  ? Fatty fish, such as salmon or trout.  ? Eggs.  ? Oysters.  · Do not use a tanning bed.  · Maintain a healthy weight. Lose weight, if needed.  · Keep all follow-up visits as told by your health care provider. This is important.  Contact a health care provider if:  · Your symptoms do not go away.  · You feel like throwing up (nausea) or you throw up (vomit).  · You have fewer bowel movements than usual or it is difficult for you to have a bowel movement (constipation).  This information is not intended to replace advice given to you by your health care provider. Make sure you discuss any questions you have with your health care provider.  Document Released: 03/11/2013 Document Revised: 05/31/2017 Document Reviewed: 05/04/2016  [employer] Interactive Patient Education © 2018 [employer]

## 2018-07-31 DIAGNOSIS — E87.6 HYPOKALEMIA: Primary | ICD-10-CM

## 2018-07-31 RX ORDER — SODIUM POLYSTYRENE SULFONATE 15 G/60ML
15 SUSPENSION ORAL; RECTAL ONCE
Qty: 4 ML | Refills: 0 | Status: SHIPPED | OUTPATIENT
Start: 2018-07-31 | End: 2018-07-31

## 2018-08-03 ENCOUNTER — TELEPHONE (OUTPATIENT)
Dept: FAMILY MEDICINE CLINIC | Facility: CLINIC | Age: 23
End: 2018-08-03

## 2018-08-03 NOTE — TELEPHONE ENCOUNTER
----- Message from Sandeep Rahman MD sent at 8/3/2018 11:37 AM CDT -----  Regarding: medication   I called pt . She finished kayxalate. Will  Recheck potassium next week   ----- Message -----  From: Vianey Killian MA  Sent: 8/3/2018  11:31 AM  To: Sandeep Rahman MD    Would like for you to call her about the med. You put her on. [phone]

## 2018-08-09 PROCEDURE — 84132 ASSAY OF SERUM POTASSIUM: CPT | Performed by: FAMILY MEDICINE

## 2018-09-17 ENCOUNTER — OFFICE VISIT (OUTPATIENT)
Dept: OBSTETRICS AND GYNECOLOGY | Facility: CLINIC | Age: 23
End: 2018-09-17

## 2018-09-17 VITALS
SYSTOLIC BLOOD PRESSURE: 147 MMHG | HEART RATE: 67 BPM | HEIGHT: 67 IN | WEIGHT: 231 LBS | DIASTOLIC BLOOD PRESSURE: 87 MMHG | BODY MASS INDEX: 36.26 KG/M2

## 2018-09-17 DIAGNOSIS — Z01.419 ENCOUNTER FOR GYNECOLOGICAL EXAMINATION WITHOUT ABNORMAL FINDING: Primary | ICD-10-CM

## 2018-09-17 DIAGNOSIS — Z30.011 ENCOUNTER FOR INITIAL PRESCRIPTION OF CONTRACEPTIVE PILLS: ICD-10-CM

## 2018-09-17 DIAGNOSIS — Z11.3 SCREEN FOR SEXUALLY TRANSMITTED DISEASES: ICD-10-CM

## 2018-09-17 PROCEDURE — 87661 TRICHOMONAS VAGINALIS AMPLIF: CPT | Performed by: NURSE PRACTITIONER

## 2018-09-17 PROCEDURE — 87591 N.GONORRHOEAE DNA AMP PROB: CPT | Performed by: NURSE PRACTITIONER

## 2018-09-17 PROCEDURE — 99395 PREV VISIT EST AGE 18-39: CPT | Performed by: NURSE PRACTITIONER

## 2018-09-17 PROCEDURE — G0123 SCREEN CERV/VAG THIN LAYER: HCPCS | Performed by: NURSE PRACTITIONER

## 2018-09-17 PROCEDURE — 87491 CHLMYD TRACH DNA AMP PROBE: CPT | Performed by: NURSE PRACTITIONER

## 2018-09-17 RX ORDER — NORETHINDRONE ACETATE AND ETHINYL ESTRADIOL 1MG-20(21)
1 KIT ORAL DAILY
Qty: 28 TABLET | Refills: 3 | Status: SHIPPED | OUTPATIENT
Start: 2018-09-17 | End: 2018-12-17

## 2018-09-17 RX ORDER — FLUTICASONE PROPIONATE 50 MCG
2 SPRAY, SUSPENSION (ML) NASAL DAILY
COMMUNITY
End: 2020-12-08

## 2018-09-17 NOTE — PROGRESS NOTES
Subjective   Loren Sharif is a 23 y.o. Here for pap smear and would like to discuss birth control options.    LMP- 8/30  Periods last for 3-8 days. The first 3 days are heavy and if they last any longer than 3 days the flow is very light to spotty.    Never had a pap smear or STD screening.      Gynecologic Exam   The patient's pertinent negatives include no genital itching, genital lesions, genital odor, genital rash, missed menses, pelvic pain, vaginal bleeding or vaginal discharge. Pertinent negatives include no abdominal pain, constipation, diarrhea, dysuria, headaches, nausea, rash, urgency or vomiting. She is sexually active. No, her partner does not have an STD. She uses condoms for contraception. Her menstrual history has been regular. There is no history of an abdominal surgery, a  section, an ectopic pregnancy, endometriosis, a gynecological surgery, herpes simplex, menorrhagia, metrorrhagia, miscarriage, ovarian cysts, perineal abscess, PID, an STD, a terminated pregnancy or vaginosis.       The following portions of the patient's history were reviewed and updated as appropriate: allergies, current medications, past family history, past medical history, past social history, past surgical history and problem list.    Review of Systems   Constitutional: Negative for activity change, appetite change, fatigue and unexpected weight change.   Respiratory: Negative for chest tightness and shortness of breath.    Cardiovascular: Negative for chest pain, palpitations and leg swelling.   Gastrointestinal: Negative for abdominal distention, abdominal pain, blood in stool, constipation, diarrhea, nausea and vomiting.   Endocrine: Negative for cold intolerance, heat intolerance, polydipsia, polyphagia and polyuria.   Genitourinary: Negative for difficulty urinating, dyspareunia, dysuria, genital sores, menorrhagia, menstrual problem, missed menses, pelvic pain, urgency, vaginal bleeding, vaginal  discharge and vaginal pain.   Musculoskeletal: Negative for gait problem and myalgias.   Skin: Negative for color change, pallor and rash.   Neurological: Negative for dizziness, weakness, light-headedness and headaches.   Hematological: Negative for adenopathy.   Psychiatric/Behavioral: Negative for agitation, confusion, dysphoric mood, self-injury and suicidal ideas. The patient is not nervous/anxious.      Denies breast complaints    Objective   Physical Exam   Constitutional: She is oriented to person, place, and time. She appears well-developed and well-nourished.   Neck: No thyromegaly present.   Cardiovascular: Normal rate, regular rhythm, normal heart sounds and intact distal pulses.    Pulmonary/Chest: Effort normal and breath sounds normal. Right breast exhibits no inverted nipple, no mass, no nipple discharge, no skin change and no tenderness. Left breast exhibits no inverted nipple, no mass, no nipple discharge, no skin change and no tenderness. Breasts are symmetrical.   Abdominal: Soft. Bowel sounds are normal. She exhibits no distension. There is no tenderness.   Genitourinary: Vagina normal and uterus normal. No breast discharge or bleeding. There is no rash, tenderness, lesion or injury on the right labia. There is no rash, tenderness, lesion or injury on the left labia. Cervix exhibits no motion tenderness, no discharge and no friability. Right adnexum displays no mass, no tenderness and no fullness. Left adnexum displays no mass, no tenderness and no fullness.   Genitourinary Comments: Pap smear and GC/CT swab obtained.   Lymphadenopathy:     She has no axillary adenopathy.        Right: No inguinal adenopathy present.        Left: No inguinal adenopathy present.   Neurological: She is alert and oriented to person, place, and time.   Skin: Skin is warm, dry and intact.   Psychiatric: She has a normal mood and affect. Her speech is normal and behavior is normal.   Nursing note and vitals  reviewed.        Assessment/Plan   Loren was seen today for gynecologic exam.    Diagnoses and all orders for this visit:    Encounter for gynecological examination without abnormal finding  -     Liquid-based Pap Smear, Screening    Screen for sexually transmitted diseases  -     Chlamydia trachomatis, Neisseria gonorrhoeae, Trichomonas vaginalis, PCR - Swab, Cervix    Encounter for initial prescription of contraceptive pills    Other orders  -     norethindrone-ethinyl estradiol FE (JUNEL FE 1/20) 1-20 MG-MCG per tablet; Take 1 tablet by mouth Daily.      R/B/A and potential s/e of OCPs discussed. F/U in 4 months for OCP recheck.

## 2018-09-19 LAB
C TRACH RRNA CVX QL NAA+PROBE: NEGATIVE
N GONORRHOEA RRNA SPEC QL NAA+PROBE: NEGATIVE
TRICHOMONAS VAGINALIS PCR: NEGATIVE

## 2018-09-20 LAB
LAB AP CASE REPORT: NORMAL
LAB AP GYN ADDITIONAL INFORMATION: NORMAL
LAB AP GYN OTHER FINDINGS: NORMAL
PATH INTERP SPEC-IMP: NORMAL
STAT OF ADQ CVX/VAG CYTO-IMP: NORMAL

## 2018-09-24 RX ORDER — METRONIDAZOLE 500 MG/1
500 TABLET ORAL 2 TIMES DAILY
Qty: 14 TABLET | Refills: 0 | Status: SHIPPED | OUTPATIENT
Start: 2018-09-24 | End: 2018-10-01

## 2018-12-17 ENCOUNTER — OFFICE VISIT (OUTPATIENT)
Dept: OBSTETRICS AND GYNECOLOGY | Facility: CLINIC | Age: 23
End: 2018-12-17

## 2018-12-17 VITALS
SYSTOLIC BLOOD PRESSURE: 153 MMHG | WEIGHT: 236 LBS | DIASTOLIC BLOOD PRESSURE: 95 MMHG | HEART RATE: 61 BPM | HEIGHT: 67 IN | BODY MASS INDEX: 37.04 KG/M2

## 2018-12-17 DIAGNOSIS — R03.0 ELEVATED BLOOD PRESSURE READING WITHOUT DIAGNOSIS OF HYPERTENSION: ICD-10-CM

## 2018-12-17 DIAGNOSIS — Z30.41 ENCOUNTER FOR SURVEILLANCE OF CONTRACEPTIVE PILLS: Primary | ICD-10-CM

## 2018-12-17 PROCEDURE — 99214 OFFICE O/P EST MOD 30 MIN: CPT | Performed by: NURSE PRACTITIONER

## 2018-12-17 RX ORDER — ACETAMINOPHEN AND CODEINE PHOSPHATE 120; 12 MG/5ML; MG/5ML
1 SOLUTION ORAL DAILY
Qty: 84 TABLET | Refills: 4 | Status: SHIPPED | OUTPATIENT
Start: 2018-12-17 | End: 2019-03-27 | Stop reason: SDDI

## 2018-12-17 NOTE — PROGRESS NOTES
Subjective   Loren Sharif is a 23 y.o. female. Here for 3 month f/u on OCPs.     LMP- last week    Has felt very emotional and has been crying over everything; not normally sensitive like that. Also has been having daily, throbbing headaches for about 1 month. Didn't have them in the beginning but is now having them frequently.       Contraception   This is a new problem. The current episode started more than 1 month ago. The problem occurs daily. The problem has been gradually worsening. Associated symptoms include headaches. Pertinent negatives include no abdominal pain, chest pain, diaphoresis, fatigue, myalgias, nausea, urinary symptoms or weakness. Nothing aggravates the symptoms. She has tried nothing for the symptoms. The treatment provided no relief.       The following portions of the patient's history were reviewed and updated as appropriate: allergies, current medications, past medical history, past social history, past surgical history and problem list.    Review of Systems   Constitutional: Positive for unexpected weight change. Negative for activity change, appetite change, diaphoresis and fatigue.   Respiratory: Negative for chest tightness and shortness of breath.    Cardiovascular: Negative for chest pain and palpitations.   Gastrointestinal: Negative for abdominal pain and nausea.   Genitourinary: Negative for dyspareunia, menstrual problem, pelvic pain, vaginal bleeding, vaginal discharge and vaginal pain.   Musculoskeletal: Negative for myalgias.   Neurological: Positive for dizziness and headaches. Negative for weakness and light-headedness.   Hematological: Negative for adenopathy.   Psychiatric/Behavioral: Negative for agitation, decreased concentration, dysphoric mood and sleep disturbance. The patient is not nervous/anxious.         Weepy       Objective   Physical Exam   Constitutional: She is oriented to person, place, and time. She appears well-developed and well-nourished. No  distress.   Cardiovascular: Normal rate, regular rhythm, normal heart sounds and intact distal pulses.   Pulmonary/Chest: Effort normal and breath sounds normal.   Neurological: She is alert and oriented to person, place, and time.   Skin: Skin is warm and dry. Capillary refill takes less than 2 seconds. She is not diaphoretic.   Psychiatric: She has a normal mood and affect. Her behavior is normal.   Nursing note and vitals reviewed.      Assessment/Plan   Loren was seen today for contraception.    Diagnoses and all orders for this visit:    Encounter for surveillance of contraceptive pills    Elevated blood pressure reading without diagnosis of hypertension    Other orders  -     norethindrone (MICRONOR) 0.35 MG tablet; Take 1 tablet by mouth Daily.     Stop NALLELY 2/2 undesired s/e of weeping and elevated BP. Start on Progesterone-only pill today. If emotional changes are not improving after 3 cycles she needs to return to discuss other options. She was given written information on Nexplanon and Genny as well. If her headaches continue she needs to see Dr. Rahman for a BP check, as her elevated pressures could be chronic, but are most likely 2/2 estrogen in the OCPs.

## 2019-01-14 RX ORDER — ERGOCALCIFEROL 1.25 MG/1
CAPSULE ORAL
Qty: 4 CAPSULE | Refills: 3 | Status: SHIPPED | OUTPATIENT
Start: 2019-01-14 | End: 2019-03-27 | Stop reason: SDUPTHER

## 2019-03-27 ENCOUNTER — OFFICE VISIT (OUTPATIENT)
Dept: FAMILY MEDICINE CLINIC | Facility: CLINIC | Age: 24
End: 2019-03-27

## 2019-03-27 VITALS
OXYGEN SATURATION: 100 % | BODY MASS INDEX: 39.02 KG/M2 | WEIGHT: 248.6 LBS | SYSTOLIC BLOOD PRESSURE: 124 MMHG | HEIGHT: 67 IN | TEMPERATURE: 99.1 F | DIASTOLIC BLOOD PRESSURE: 95 MMHG | HEART RATE: 96 BPM

## 2019-03-27 DIAGNOSIS — R14.0 BLOATING: ICD-10-CM

## 2019-03-27 DIAGNOSIS — Z00.00 GENERAL MEDICAL EXAMINATION: ICD-10-CM

## 2019-03-27 DIAGNOSIS — R53.83 OTHER FATIGUE: ICD-10-CM

## 2019-03-27 DIAGNOSIS — Z13.1 ENCOUNTER FOR SCREENING FOR DIABETES MELLITUS: ICD-10-CM

## 2019-03-27 DIAGNOSIS — F32.9 MAJOR DEPRESSIVE DISORDER WITH SINGLE EPISODE, REMISSION STATUS UNSPECIFIED: Primary | ICD-10-CM

## 2019-03-27 DIAGNOSIS — E66.9 OBESITY, UNSPECIFIED CLASSIFICATION, UNSPECIFIED OBESITY TYPE, UNSPECIFIED WHETHER SERIOUS COMORBIDITY PRESENT: ICD-10-CM

## 2019-03-27 DIAGNOSIS — E55.9 VITAMIN D DEFICIENCY: ICD-10-CM

## 2019-03-27 DIAGNOSIS — G47.30 SLEEP APNEA, UNSPECIFIED TYPE: ICD-10-CM

## 2019-03-27 DIAGNOSIS — Z13.29 ENCOUNTER FOR SCREENING FOR ENDOCRINE DISORDER: ICD-10-CM

## 2019-03-27 DIAGNOSIS — R73.03 PREDIABETES: ICD-10-CM

## 2019-03-27 DIAGNOSIS — E87.5 HYPERKALEMIA: ICD-10-CM

## 2019-03-27 DIAGNOSIS — E78.5 HYPERLIPIDEMIA, UNSPECIFIED HYPERLIPIDEMIA TYPE: ICD-10-CM

## 2019-03-27 PROCEDURE — 99214 OFFICE O/P EST MOD 30 MIN: CPT | Performed by: FAMILY MEDICINE

## 2019-03-27 RX ORDER — ERGOCALCIFEROL 1.25 MG/1
50000 CAPSULE ORAL WEEKLY
Qty: 4 CAPSULE | Refills: 3 | Status: SHIPPED | OUTPATIENT
Start: 2019-03-27 | End: 2020-12-08

## 2019-03-27 RX ORDER — BUPROPION HYDROCHLORIDE 150 MG/1
150 TABLET ORAL DAILY
Qty: 30 TABLET | Refills: 3 | Status: SHIPPED | OUTPATIENT
Start: 2019-03-27 | End: 2020-12-08

## 2019-03-27 NOTE — PATIENT INSTRUCTIONS
wellbutrin  mg daily. For depression    MIDNITE MELATONIN for [facility]   Take 1 hours before bed    Will get labwork    Referral to Dr. Agrawal and counseling.      major Depressive Disorder, Adult  Major depressive disorder (MDD) is a mental health condition. MDD often makes you feel sad, hopeless, or helpless. MDD can also cause symptoms in your body. MDD can affect your:  · Work.  · School.  · Relationships.  · Other normal activities.    MDD can range from mild to very bad. It may occur once (single episode MDD). It can also occur many times (recurrent MDD).  The main symptoms of MDD often include:  · Feeling sad, depressed, or irritable most of the time.  · Loss of interest.    MDD symptoms also include:  · Sleeping too much or too little.  · Eating too much or too little.  · A change in your weight.  · Feeling tired (fatigue) or having low energy.  · Feeling worthless.  · Feeling guilty.  · Trouble making decisions.  · Trouble thinking clearly.  · Thoughts of suicide or harming others.  · Feeling weak.  · Feeling agitated.  · Keeping yourself from being around other people (isolation).    Follow these instructions at home:  Activity  · Do these things as told by your doctor:  ? Go back to your normal activities.  ? Exercise regularly.  ? Spend time outdoors.  Alcohol  · Talk with your doctor about how alcohol can affect your antidepressant medicines.  · Do not drink alcohol. Or, limit how much alcohol you drink.  ? This means no more than 1 drink a day for nonpregnant women and 2 drinks a day for men. One drink equals one of these:  § 12 oz of beer.  § 5 oz of wine.  § 1½ oz of hard liquor.  General instructions  · Take over-the-counter and prescription medicines only as told by your doctor.  · Eat a healthy diet.  · Get plenty of sleep.  · Find activities that you enjoy. Make time to do them.  · Think about joining a support group. Your doctor may be able to suggest a group for  you.  · Keep all follow-up visits as told by your doctor. This is important.  Where to find more information:  · National [place] on Mental Illness:  ? www.michael.org  · [place] National [place] of Mental Health:  ? www.nimh.nih.gov  · National Suicide Prevention Lifeline:  ? 1-374.252.2003. This is free, 24-hour help.  Contact a doctor if:  · Your symptoms get worse.  · You have new symptoms.  Get help right away if:  · You self-harm.  · You see, hear, taste, smell, or feel things that are not present (hallucinate).  If you ever feel like you may hurt yourself or others, or have thoughts about taking your own life, get help right away. You can go to your nearest emergency department or call:  · Your local emergency services (911 in the [place]  · A suicide crisis helpline, such as the National Suicide Prevention Lifeline:  ? 1-731.419.1807. This is open 24 hours a day.    This information is not intended to replace advice given to you by your health care provider. Make sure you discuss any questions you have with your health care provider.  Document Released: 11/28/2016 Document Revised: 09/03/2017 Document Reviewed: 09/03/2017  [employer]vier Interactive Patient Education © 2019 [employer]    Depression Screening  Depression screening is a tool that your health care provider can use to learn if you have symptoms of depression. Depression is a common condition with many symptoms that are also often found in other conditions. Depression is treatable, but it must first be diagnosed. You may not know that certain feelings, thoughts, and behaviors that you are having can be symptoms of depression. Taking a depression screening test can help you and your health care provider decide if you need more assessment, or if you should be referred to a mental health care provider.  What are the screening tests?  · You may have a physical exam to see if another condition is affecting your mental health. You may have a blood or urine  sample taken during the physical exam.  · You may be interviewed using a screening tool that was developed from research, such as one of these:  ? Patient Health Questionnaire (PHQ). This is a set of either 2 or 9 questions. A health care provider who has been trained to score this screening test uses a guide to assess if your symptoms suggest that you may have depression.  ? Christine Depression Rating Scale (HAM-D). This is a set of either 17 or 24 questions. You may be asked to take it again during or after your treatment, to see if your depression has gotten better.  ? [place] Depression Inventory (BDI). This is a set of 21 multiple choice questions. Your health care provider scores your answers to assess:  § Your level of depression, ranging from mild to severe.  § Your response to treatment.  · Your health care provider may talk with you about your daily activities, such as eating, sleeping, work, and recreation, and ask if you have had any changes in activity.  · Your health care provider may ask you to see a mental health specialist, such as a psychiatrist or psychologist, for more evaluation.  Who should be screened for depression?  · All adults, including adults with a family history of a mental health disorder.  · Adolescents who are 12-18 years old.  · People who are recovering from a myocardial infarction (MI).  · Pregnant women, or women who have given birth.  · People who have a long-term (chronic) illness.  · Anyone who has been diagnosed with another type of a mental health disorder.  · Anyone who has symptoms that could show depression.  What do my results mean?  Your health care provider will review the results of your depression screening, physical exam, and lab tests. Positive screens suggest that you may have depression. Screening is the first step in getting the care that you may need. It is up to you to get your screening results. Ask your health care provider, or the department that is doing  your screening tests, when your results will be ready. Talk with your health care provider about your results and diagnosis.  A diagnosis of depression is made using the Diagnostic and Statistical Manual of Mental Disorders (DSM-V). This is a book that lists the number and type of symptoms that must be present for a health care provider to give a specific diagnosis.   Your health care provider may work with you to treat your symptoms of depression, or your health care provider may help you find a mental health provider who can assess, diagnose, and treat your depression.  Get help right away if:  · You have thoughts about hurting yourself or others.  If you ever feel like you may hurt yourself or others, or have thoughts about taking your own life, get help right away. You can go to your nearest emergency department or call:  · Your local emergency services (911 in the [place]  · A suicide crisis helpline, such as the National Suicide Prevention Lifeline at 1-959.213.8699. This is open 24 hours a day.    Summary  · Depression screening is the first step in getting the help that you may need.  · If your screening test shows symptoms of depression (is positive), your health care provider may ask you to see a mental health provider.  · Anyone who is age 12 or older should be screened for depression.  This information is not intended to replace advice given to you by your health care provider. Make sure you discuss any questions you have with your health care provider.  Document Released: 05/04/2018 Document Revised: 05/04/2018 Document Reviewed: 05/04/2018  [employer] Interactive Patient Education © 2019 [employer]

## 2019-03-27 NOTE — PROGRESS NOTES
Subjective   Loren Sharif is a 23 y.o. female.     Problem List  1. Obesity BMI >30   2. Former smoker   3. Exercise induced asthma   4. Allergic rhinitis   5. Vitamin D deficiency   6. Prediabetes   7. Dysilpidemia ASCVD Risk low   8. Hyperkalemia   9. Chronic fatigue  10. 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 She also works at [employer] as a      FamilyHx  -mother - Anemia, celiac disease  -father - unknown  -siblings- both anemic., ITP       7/30/18 Pt is here for followup and recheck. Since last visit pt had labwork that shoed normal Thyroid studies. She did have Vitamin D deficiency and pt was started on Vitamin D pill once a week. She is also prediabetic and hga1c at 5.8.   She also on lipid panel had normal LDL and total cholesterol but HDL was low. ASCVD risk is low. On CMP  She did have high potassium at 5.5. She is due for new recheck. CBC was normal. Liver enzymes and kidney function was normal. Weight today is 229 lbs and it is 227. She has tried to eat healthy and is more health conscience    3/27/19 pt is here for recheck on her obesity,  Allergic rhinitis, fatigue, depression, prediabetes,  Vitamin D deficiency.   She is due for new labwork. She has been fatigued and stressed. She is a manager at  [employer] and has been stressed taking care of employees. She averages about 3-4 hours of sleep at nighttime. She is tired during the day. She states she is depressed before . Her menstrual periods are erratic.  She is not suicidal. She lives alone now.  She also would like to be checked for celiac disease due to bloating Her mother has celiac disease.  She also has not had a sleep study     Obesity   This is a chronic problem. The problem occurs constantly. The problem has been unchanged. Pertinent negatives include no abdominal pain, anorexia, arthralgias,  change in bowel habit, chest pain, chills, congestion, coughing, diaphoresis, fatigue, fever, headaches, joint swelling, myalgias, nausea, neck pain, numbness, rash, sore throat, swollen glands, urinary symptoms, vertigo, visual change, vomiting or weakness. Nothing aggravates the symptoms. She has tried nothing for the symptoms. The treatment provided no relief.   Fatigue   This is a chronic problem. The current episode started more than 1 year ago. The problem occurs constantly. The problem has been waxing and waning. Pertinent negatives include no abdominal pain, anorexia, arthralgias, change in bowel habit, chest pain, chills, congestion, coughing, diaphoresis, fatigue, fever, headaches, joint swelling, myalgias, nausea, neck pain, numbness, rash, sore throat, swollen glands, urinary symptoms, vertigo, visual change, vomiting or weakness. Treatments tried: iron therapy b12 injections.   The treatment provided mild relief.   Depression   Visit Type: follow-up  Patient presents with the following symptoms: anhedonia, decreased concentration, depressed mood, excessive worry, fatigue, feelings of hopelessness, feelings of worthlessness, irritability, memory impairment, nervousness/anxiety, panic, restlessness and weight gain.  Patient is not experiencing: chest pain, choking sensation, compulsions, confusion, dizziness, dry mouth, hypersomnia, hyperventilation, impotence, insomnia, malaise, muscle tension, nausea, obsessions, palpitations, psychomotor agitation, psychomotor retardation, shortness of breath, suicidal ideas, suicidal planning and thoughts of death.  Frequency of symptoms: constantly   Severity: moderate   Sleep quality: fair             The following portions of the patient's history were reviewed and updated as appropriate: allergies, current medications, past family history, past medical history, past social history, past surgical history and problem list.  Patient Active Problem List   Diagnosis    • Annual physical exam   • General medical examination   • Obesity   • Encounter for screening for endocrine disorder   • Encounter for screening for diabetes mellitus   • Encounter for screening for cardiovascular disorders   • Allergic rhinitis   • Encounter for Papanicolaou smear of cervix   • Asthma, exercise induced   • Viral warts   • Prediabetes   • Vitamin D deficiency   • Hyperkalemia   • Encounter for surveillance of contraceptive pills   • Elevated blood pressure reading without diagnosis of hypertension   • Major depressive disorder with single episode     Current Outpatient Medications on File Prior to Visit   Medication Sig Dispense Refill   • albuterol (PROVENTIL HFA;VENTOLIN HFA) 108 (90 Base) MCG/ACT inhaler Inhale 2 puffs Every 4 (Four) Hours. 1 inhaler 3   • fluticasone (FLONASE) 50 MCG/ACT nasal spray 2 sprays into the nostril(s) as directed by provider Daily.     • montelukast (SINGULAIR) 5 MG chewable tablet Chew 1 tablet Every Night. 30 tablet 3   • norethindrone (MICRONOR) 0.35 MG tablet Take 1 tablet by mouth Daily. 84 tablet 4   • Salicylic Acid 27.5 % liquid Apply 1 application topically 2 (Two) Times a Day. Apply for 4-6 weeks 10 mL 1   • vitamin D (ERGOCALCIFEROL) 09659 units capsule capsule TAKE 1 CAPSULE BY MOUTH EVERY WEEK 4 capsule 3     No current facility-administered medications on file prior to visit.        Review of Systems   Constitutional: Positive for irritability and unexpected weight gain. Negative for chills, diaphoresis, fatigue and fever.   HENT: Negative.  Negative for congestion and sore throat.    Eyes: Negative.    Respiratory: Negative.  Negative for cough, choking and shortness of breath.    Cardiovascular: Negative.  Negative for chest pain and palpitations.   Gastrointestinal: Negative.  Negative for abdominal pain, anorexia, change in bowel habit, nausea and vomiting.   Endocrine: Negative.    Genitourinary: Negative.  Negative for impotence.  "  Musculoskeletal: Negative.  Negative for arthralgias, joint swelling, myalgias and neck pain.   Skin: Negative.  Negative for rash.   Allergic/Immunologic: Positive for environmental allergies.   Neurological: Negative.  Negative for vertigo, weakness, numbness and confusion.   Hematological: Negative.    Psychiatric/Behavioral: Positive for decreased concentration and depressed mood. Negative for suicidal ideas. The patient is nervous/anxious. The patient does not have insomnia.        Objective    /95 (BP Location: Right arm, Patient Position: Sitting, Cuff Size: Adult)   Pulse 96   Temp 99.1 °F (37.3 °C)   Ht 170.2 cm (67\")   Wt 113 kg (248 lb 9.6 oz)   LMP 03/07/2019   SpO2 100%   BMI 38.94 kg/m²     There were no vitals taken for this visit.    Office Visit on 09/17/2018   Component Date Value Ref Range Status   • Case Report 09/17/2018    Final                    Value:Gynecologic Cytology Report                       Case: [id number]                                  Authorizing Provider:  Halle Leach APRN       Collected:           09/17/2018 09:20 AM          Ordering Location:     [facility]     Received:            09/17/2018 09:20 AM                                 [facility]                                                                 First Screen:          Amelia Pardo                                                          Pathologist:           Alex Loredo MD                                                        Specimen:    Liquid-Based Pap, Screening, Cervix                                                       • Interpretation 09/17/2018 Negative for intraepithelial lesion or malignancy    Final   • Other Findings 09/17/2018 Reactive cellular changes  Shift in mary suggestive of bacterial vaginosis  Mild inflammation   Final   • Specimen Adequacy 09/17/2018 Satisfactory for evaluation, endocervical/transformation zone component present   Final "   • Additional Information 09/17/2018    Final                    Value:This result contains rich text formatting which cannot be displayed here.   • Chlamydia DNA by PCR 09/17/2018 Negative  Negative Final   • Neisseria gonorrhoeae by PCR 09/17/2018 Negative  Negative Final   • Trichomonas vaginalis PCR 09/17/2018 Negative   Final       Physical Exam   Constitutional: She is oriented to person, place, and time. She appears well-developed and well-nourished. No distress.   HENT:   Head: Normocephalic and atraumatic.   Right Ear: External ear normal.   Left Ear: External ear normal.   Mild erythema on pharynx. Pt has tonsils.  No white patches    Eyes: Conjunctivae and EOM are normal. Pupils are equal, round, and reactive to light. Right eye exhibits no discharge. Left eye exhibits no discharge. No scleral icterus.   Neck: Normal range of motion. Neck supple. No JVD present. No tracheal deviation present. No thyromegaly present.   Cardiovascular: Normal rate, regular rhythm, normal heart sounds and intact distal pulses.   Pulmonary/Chest: Effort normal. No stridor. No respiratory distress. She has no wheezes.   Abdominal: Soft. She exhibits no distension and no mass. There is no tenderness. There is no guarding.   Obese abdomen    Musculoskeletal: Normal range of motion. She exhibits no edema, tenderness or deformity.        Hands:  Lymphadenopathy:     She has no cervical adenopathy.   Neurological: She is alert and oriented to person, place, and time. She displays normal reflexes. No cranial nerve deficit. Coordination normal.   Skin: Skin is warm and dry. No rash noted. She is not diaphoretic. No erythema.   Psychiatric: She has a normal mood and affect. Her behavior is normal.   Nursing note and vitals reviewed.        Assessment/Plan   Problems Addressed this Visit        Digestive    Obesity - Primary    Vitamin D deficiency       Other    Encounter for screening for endocrine disorder    Encounter for  screening for diabetes mellitus    Prediabetes    Hyperkalemia    Major depressive disorder with single episode      Other Visit Diagnoses     Hyperlipidemia, unspecified hyperlipidemia type          -will get labwork   -prediabetes - hga1c at 5.8.  Gave prediabetes and prevention of DM type 2 tips  -dyslipdiemia-  ASCVD risk low. Recommend DASH diet high  Fiber diet.    - vitamin D deficiency - gave information on Vitamin D to go home with. Vitamin D once aw [place]   -gave information on gardasil vaccination.. She states she did get vaccination when she was young but developed a severe reaction. Will hold for now. Advised pt to get pap smear   -consdier weight loss surgery   -referral to Sleep Medicine for fatigue. Services appreciated  -annual physical exam last visit   -hyperkalemia -recheck CMP   -depression -recommend counseling and wellbutrin  mg daily.  Will refer to [facility] for counseling.  Gave information on depression today  -for exercise induced asthma - refilled inhaler. singulair at bedtime   -allergic rhinitis-  flonase PRN along with singulair 5 mg at bedtime. Drug information provided  -for wart on right index finger. Salicylic acid to apply to area twice a day for 4-6 week.s drug information provided  -advised pt to be safe and call with any questions or concerns. All questions addressed today  -obesity -weight loss information provided.  Counseled >5 minutes   -recheck in 1 month         This document has been electronically signed by Sandeep Rahman MD on March 27, 2019 7:49 AM               Review of Systems   Constitutional: Positive for irritability and weight gain. Negative for chills, diaphoresis, fatigue and fever.   HENT: Negative.  Negative for congestion and sore throat.    Eyes: Negative.    Respiratory: Negative.  Negative for cough, choking and shortness of breath.    Cardiovascular: Negative.  Negative for chest pain and palpitations.   Gastrointestinal: Negative.   Negative for abdominal pain, anorexia, change in bowel habit, nausea and vomiting.   Endocrine: Negative.    Genitourinary: Negative.  Negative for impotence.   Musculoskeletal: Negative.  Negative for arthralgias, joint swelling, myalgias and neck pain.   Skin: Negative.  Negative for rash.   Allergic/Immunologic: Positive for environmental allergies.   Neurological: Negative.  Negative for vertigo, weakness, numbness and headaches.   Hematological: Negative.    Psychiatric/Behavioral: Positive for decreased concentration. Negative for confusion and suicidal ideas. The patient is nervous/anxious. The patient does not have insomnia.        Physical Exam   Constitutional: She is oriented to person, place, and time. She appears well-developed and well-nourished. No distress.   HENT:   Head: Normocephalic and atraumatic.   Right Ear: External ear normal.   Left Ear: External ear normal.   Mild erythema on pharynx. Pt has tonsils.  No white patches    Eyes: Conjunctivae and EOM are normal. Pupils are equal, round, and reactive to light. Right eye exhibits no discharge. Left eye exhibits no discharge. No scleral icterus.   Neck: Normal range of motion. Neck supple. No JVD present. No tracheal deviation present. No thyromegaly present.   Cardiovascular: Normal rate, regular rhythm, normal heart sounds and intact distal pulses.   Pulmonary/Chest: Effort normal. No stridor. No respiratory distress. She has no wheezes.   Abdominal: Soft. She exhibits no distension and no mass. There is no tenderness. There is no guarding.   Obese abdomen    Musculoskeletal: Normal range of motion. She exhibits no edema, tenderness or deformity.        Hands:  Lymphadenopathy:     She has no cervical adenopathy.   Neurological: She is alert and oriented to person, place, and time. She displays normal reflexes. No cranial nerve deficit. Coordination normal.   Skin: Skin is warm and dry. No rash noted. She is not diaphoretic. No erythema.    Psychiatric: She has a normal mood and affect. Her behavior is normal.   Nursing note and vitals reviewed.

## 2019-04-02 ENCOUNTER — LAB (OUTPATIENT)
Dept: LAB | Facility: HOSPITAL | Age: 24
End: 2019-04-02

## 2019-04-02 DIAGNOSIS — Z00.00 GENERAL MEDICAL EXAMINATION: ICD-10-CM

## 2019-04-02 DIAGNOSIS — R14.0 BLOATING: ICD-10-CM

## 2019-04-02 DIAGNOSIS — R53.83 OTHER FATIGUE: ICD-10-CM

## 2019-04-02 LAB
25(OH)D3 SERPL-MCNC: 14.9 NG/ML (ref 30–100)
ALBUMIN SERPL-MCNC: 4.7 G/DL (ref 3.5–5.2)
ALBUMIN/GLOB SERPL: 1.5 G/DL
ALP SERPL-CCNC: 65 U/L (ref 39–117)
ALT SERPL W P-5'-P-CCNC: 9 U/L (ref 1–33)
ANION GAP SERPL CALCULATED.3IONS-SCNC: 12.9 MMOL/L
AST SERPL-CCNC: 14 U/L (ref 1–32)
BASOPHILS # BLD AUTO: 0.02 10*3/MM3 (ref 0–0.2)
BASOPHILS NFR BLD AUTO: 0.3 % (ref 0–1.5)
BILIRUB SERPL-MCNC: 0.4 MG/DL (ref 0.2–1.2)
BUN BLD-MCNC: 10 MG/DL (ref 6–20)
BUN/CREAT SERPL: 13.9 (ref 7–25)
CALCIUM SPEC-SCNC: 9.9 MG/DL (ref 8.6–10.5)
CHLORIDE SERPL-SCNC: 99 MMOL/L (ref 98–107)
CHOLEST SERPL-MCNC: 149 MG/DL (ref 0–200)
CO2 SERPL-SCNC: 26.1 MMOL/L (ref 22–29)
CREAT BLD-MCNC: 0.72 MG/DL (ref 0.57–1)
DEPRECATED RDW RBC AUTO: 38.2 FL (ref 37–54)
EOSINOPHIL # BLD AUTO: 0.13 10*3/MM3 (ref 0–0.4)
EOSINOPHIL NFR BLD AUTO: 1.9 % (ref 0.3–6.2)
ERYTHROCYTE [DISTWIDTH] IN BLOOD BY AUTOMATED COUNT: 11.9 % (ref 12.3–15.4)
FERRITIN SERPL-MCNC: 22 NG/ML (ref 13–150)
FOLATE SERPL-MCNC: 11.6 NG/ML (ref 4.78–24.2)
GFR SERPL CREATININE-BSD FRML MDRD: 123 ML/MIN/1.73
GLOBULIN UR ELPH-MCNC: 3.1 GM/DL
GLUCOSE BLD-MCNC: 84 MG/DL (ref 65–99)
HBA1C MFR BLD: 5.7 % (ref 4.8–5.6)
HCT VFR BLD AUTO: 40.5 % (ref 34–46.6)
HDLC SERPL-MCNC: 67 MG/DL (ref 40–60)
HGB BLD-MCNC: 12.8 G/DL (ref 12–15.9)
IMM GRANULOCYTES # BLD AUTO: 0.02 10*3/MM3 (ref 0–0.05)
IMM GRANULOCYTES NFR BLD AUTO: 0.3 % (ref 0–0.5)
IRON 24H UR-MRATE: 83 MCG/DL (ref 37–145)
IRON SATN MFR SERPL: 17 % (ref 20–50)
LDLC SERPL CALC-MCNC: 74 MG/DL (ref 0–100)
LDLC/HDLC SERPL: 1.1 {RATIO}
LYMPHOCYTES # BLD AUTO: 2.03 10*3/MM3 (ref 0.7–3.1)
LYMPHOCYTES NFR BLD AUTO: 30.2 % (ref 19.6–45.3)
MCH RBC QN AUTO: 27.5 PG (ref 26.6–33)
MCHC RBC AUTO-ENTMCNC: 31.6 G/DL (ref 31.5–35.7)
MCV RBC AUTO: 87.1 FL (ref 79–97)
MONOCYTES # BLD AUTO: 0.63 10*3/MM3 (ref 0.1–0.9)
MONOCYTES NFR BLD AUTO: 9.4 % (ref 5–12)
NEUTROPHILS # BLD AUTO: 3.9 10*3/MM3 (ref 1.4–7)
NEUTROPHILS NFR BLD AUTO: 57.9 % (ref 42.7–76)
NRBC BLD AUTO-RTO: 0 /100 WBC (ref 0–0)
PLATELET # BLD AUTO: 352 10*3/MM3 (ref 140–450)
PMV BLD AUTO: 10.1 FL (ref 6–12)
POTASSIUM BLD-SCNC: 4.3 MMOL/L (ref 3.5–5.2)
PROT SERPL-MCNC: 7.8 G/DL (ref 6–8.5)
RBC # BLD AUTO: 4.65 10*6/MM3 (ref 3.77–5.28)
SODIUM BLD-SCNC: 138 MMOL/L (ref 136–145)
T3FREE SERPL-MCNC: 3.49 PG/ML (ref 2–4.4)
T4 FREE SERPL-MCNC: 1.14 NG/DL (ref 0.93–1.7)
TIBC SERPL-MCNC: 487 MCG/DL (ref 298–536)
TRANSFERRIN SERPL-MCNC: 327 MG/DL (ref 200–360)
TRIGL SERPL-MCNC: 42 MG/DL (ref 0–150)
TSH SERPL DL<=0.05 MIU/L-ACNC: 1.98 MIU/ML (ref 0.27–4.2)
VIT B12 BLD-MCNC: 336 PG/ML (ref 211–946)
VLDLC SERPL-MCNC: 8.4 MG/DL (ref 5–40)
WBC NRBC COR # BLD: 6.73 10*3/MM3 (ref 3.4–10.8)

## 2019-04-02 PROCEDURE — 84466 ASSAY OF TRANSFERRIN: CPT

## 2019-04-02 PROCEDURE — 86003 ALLG SPEC IGE CRUDE XTRC EA: CPT

## 2019-04-02 PROCEDURE — 80053 COMPREHEN METABOLIC PANEL: CPT

## 2019-04-02 PROCEDURE — 82784 ASSAY IGA/IGD/IGG/IGM EACH: CPT

## 2019-04-02 PROCEDURE — 84439 ASSAY OF FREE THYROXINE: CPT

## 2019-04-02 PROCEDURE — 84481 FREE ASSAY (FT-3): CPT

## 2019-04-02 PROCEDURE — 83540 ASSAY OF IRON: CPT

## 2019-04-02 PROCEDURE — 83036 HEMOGLOBIN GLYCOSYLATED A1C: CPT

## 2019-04-02 PROCEDURE — 85025 COMPLETE CBC W/AUTO DIFF WBC: CPT

## 2019-04-02 PROCEDURE — 82728 ASSAY OF FERRITIN: CPT

## 2019-04-02 PROCEDURE — 84443 ASSAY THYROID STIM HORMONE: CPT

## 2019-04-02 PROCEDURE — 83516 IMMUNOASSAY NONANTIBODY: CPT

## 2019-04-02 PROCEDURE — 82746 ASSAY OF FOLIC ACID SERUM: CPT

## 2019-04-02 PROCEDURE — 86255 FLUORESCENT ANTIBODY SCREEN: CPT

## 2019-04-02 PROCEDURE — 80061 LIPID PANEL: CPT

## 2019-04-02 PROCEDURE — 82607 VITAMIN B-12: CPT

## 2019-04-02 PROCEDURE — 82306 VITAMIN D 25 HYDROXY: CPT

## 2019-04-03 LAB
ENDOMYSIUM IGA SER QL: NEGATIVE
GLIADIN PEPTIDE IGA SER-ACNC: 2 UNITS (ref 0–19)
GLIADIN PEPTIDE IGA SER-ACNC: 2 UNITS (ref 0–19)
GLIADIN PEPTIDE IGG SER-ACNC: 3 UNITS (ref 0–19)
GLIADIN PEPTIDE IGG SER-ACNC: 3 UNITS (ref 0–19)
IGA SERPL-MCNC: 200 MG/DL (ref 87–352)
TTG IGA SER-ACNC: <2 U/ML (ref 0–3)
TTG IGA SER-ACNC: <2 U/ML (ref 0–3)
TTG IGG SER-ACNC: <2 U/ML (ref 0–5)
TTG IGG SER-ACNC: <2 U/ML (ref 0–5)

## 2019-04-09 LAB
CALIF WALNUT POLN IGE QN: <0.1 KU/L
CODFISH IGE QN: <0.1 KU/L
CONV CLASS DESCRIPTION: ABNORMAL
COW MILK IGE QN: <0.1 KU/L
EGG WHITE IGE QN: <0.1 KU/L
GLUTEN IGE QN: <0.1 KU/L
HAZELNUT IGE QN: 0.11 KU/L
PEANUT IGE QN: <0.1 KU/L
SCALLOP IGE QN: <0.1 KU/L
SESAME SEED IGE: 1.83 KU/L
SHRIMP IGE: 1.33 KU/L
SOYBEAN IGE QN: 0.19 KU/L
WHEAT IGE QN: 0.3 KU/L

## 2019-04-10 ENCOUNTER — TELEPHONE (OUTPATIENT)
Dept: FAMILY MEDICINE CLINIC | Facility: CLINIC | Age: 24
End: 2019-04-10

## 2019-04-10 RX ORDER — ERGOCALCIFEROL 1.25 MG/1
50000 CAPSULE ORAL WEEKLY
Qty: 4 CAPSULE | Refills: 3 | Status: CANCELLED | OUTPATIENT
Start: 2019-04-10

## 2019-04-10 NOTE — TELEPHONE ENCOUNTER
----- Message from Sandeep Rahman MD sent at 4/9/2019  8:15 AM CDT -----  Call pt    On food allergy test she is sensitive to sesame seeds, and shrimp    There is low sensitivyt to soybean, hazelnut and wheat     Celiac test negative. She does not have gluten sensitivyt    Thyroid studies normal     Vitamin D is low and start on Vitamin D3 50,000 units once a week. Give 4 pills and 3 refills    Kidney function and liver function normal     Lipid panel stable     Iron saturation is low. REcommend starting on iron pill 325 mg PO Q daily. Give 30 pills and 3 refills May cause constipation and black stools. Take with Vitamin C for better absorption.      CBC normal. No anemia    Recheck on next visit. Thanks  Unable to reach pt,left message to call me back  Pt returned my call about her labs,added meds

## 2019-04-11 RX ORDER — FERROUS SULFATE 325(65) MG
325 TABLET ORAL
Qty: 30 TABLET | Refills: 3 | Status: SHIPPED | OUTPATIENT
Start: 2019-04-11 | End: 2020-12-08

## 2019-04-23 PROBLEM — Z91.018 FOOD ALLERGY: Status: ACTIVE | Noted: 2019-04-23

## 2019-04-23 PROBLEM — Z13.6 ENCOUNTER FOR SCREENING FOR CARDIOVASCULAR DISORDERS: Status: RESOLVED | Noted: 2018-07-16 | Resolved: 2019-04-23

## 2019-04-23 PROBLEM — Z13.29 ENCOUNTER FOR SCREENING FOR ENDOCRINE DISORDER: Status: RESOLVED | Noted: 2018-07-16 | Resolved: 2019-04-23

## 2019-04-23 PROBLEM — Z13.1 ENCOUNTER FOR SCREENING FOR DIABETES MELLITUS: Status: RESOLVED | Noted: 2018-07-16 | Resolved: 2019-04-23

## 2019-07-19 RX ORDER — PRENATAL 147/IRON/FOLIC ACID 13 MG-1 MG
TABLET ORAL
Qty: 90 TABLET | Refills: 0 | OUTPATIENT
Start: 2019-07-19

## 2020-12-08 ENCOUNTER — OFFICE VISIT (OUTPATIENT)
Dept: FAMILY MEDICINE CLINIC | Facility: CLINIC | Age: 25
End: 2020-12-08

## 2020-12-08 VITALS
WEIGHT: 256.6 LBS | HEIGHT: 67 IN | HEART RATE: 86 BPM | SYSTOLIC BLOOD PRESSURE: 138 MMHG | BODY MASS INDEX: 40.27 KG/M2 | DIASTOLIC BLOOD PRESSURE: 90 MMHG | OXYGEN SATURATION: 99 % | TEMPERATURE: 97.5 F

## 2020-12-08 DIAGNOSIS — E66.01 MORBID OBESITY (HCC): ICD-10-CM

## 2020-12-08 DIAGNOSIS — R03.0 ELEVATED BLOOD PRESSURE READING: ICD-10-CM

## 2020-12-08 DIAGNOSIS — F33.2 SEVERE EPISODE OF RECURRENT MAJOR DEPRESSIVE DISORDER, WITHOUT PSYCHOTIC FEATURES (HCC): ICD-10-CM

## 2020-12-08 DIAGNOSIS — Z13.29 ENCOUNTER FOR SCREENING FOR ENDOCRINE DISORDER: ICD-10-CM

## 2020-12-08 DIAGNOSIS — Z00.00 ANNUAL PHYSICAL EXAM: ICD-10-CM

## 2020-12-08 DIAGNOSIS — M25.50 MULTIPLE JOINT PAIN: Primary | ICD-10-CM

## 2020-12-08 DIAGNOSIS — E55.9 VITAMIN D DEFICIENCY: ICD-10-CM

## 2020-12-08 DIAGNOSIS — Z15.89 MTHFR GENE MUTATION: ICD-10-CM

## 2020-12-08 DIAGNOSIS — R73.03 PREDIABETES: ICD-10-CM

## 2020-12-08 DIAGNOSIS — Z00.00 GENERAL MEDICAL EXAMINATION: ICD-10-CM

## 2020-12-08 DIAGNOSIS — R53.82 CHRONIC FATIGUE: ICD-10-CM

## 2020-12-08 DIAGNOSIS — Z11.59 NEED FOR HEPATITIS C SCREENING TEST: ICD-10-CM

## 2020-12-08 DIAGNOSIS — Z13.1 ENCOUNTER FOR SCREENING FOR DIABETES MELLITUS: ICD-10-CM

## 2020-12-08 DIAGNOSIS — Z13.6 ENCOUNTER FOR SCREENING FOR CARDIOVASCULAR DISORDERS: ICD-10-CM

## 2020-12-08 PROCEDURE — 99214 OFFICE O/P EST MOD 30 MIN: CPT | Performed by: FAMILY MEDICINE

## 2020-12-08 RX ORDER — SERTRALINE HYDROCHLORIDE 25 MG/1
25 TABLET, FILM COATED ORAL DAILY
COMMUNITY
End: 2020-12-08 | Stop reason: SDUPTHER

## 2020-12-08 RX ORDER — SERTRALINE HYDROCHLORIDE 25 MG/1
25 TABLET, FILM COATED ORAL DAILY
Qty: 30 TABLET | Refills: 3 | Status: SHIPPED | OUTPATIENT
Start: 2020-12-08 | End: 2021-02-26 | Stop reason: SDUPTHER

## 2020-12-14 ENCOUNTER — LAB (OUTPATIENT)
Dept: LAB | Facility: HOSPITAL | Age: 25
End: 2020-12-14

## 2020-12-14 DIAGNOSIS — Z13.29 ENCOUNTER FOR SCREENING FOR ENDOCRINE DISORDER: ICD-10-CM

## 2020-12-14 DIAGNOSIS — R73.03 PREDIABETES: ICD-10-CM

## 2020-12-14 DIAGNOSIS — Z00.00 GENERAL MEDICAL EXAMINATION: ICD-10-CM

## 2020-12-14 DIAGNOSIS — Z00.00 ANNUAL PHYSICAL EXAM: ICD-10-CM

## 2020-12-14 DIAGNOSIS — Z11.59 NEED FOR HEPATITIS C SCREENING TEST: ICD-10-CM

## 2020-12-14 DIAGNOSIS — Z15.89 MTHFR GENE MUTATION: ICD-10-CM

## 2020-12-14 DIAGNOSIS — M25.50 MULTIPLE JOINT PAIN: ICD-10-CM

## 2020-12-14 DIAGNOSIS — Z13.6 ENCOUNTER FOR SCREENING FOR CARDIOVASCULAR DISORDERS: ICD-10-CM

## 2020-12-14 DIAGNOSIS — Z13.1 ENCOUNTER FOR SCREENING FOR DIABETES MELLITUS: ICD-10-CM

## 2020-12-14 DIAGNOSIS — F33.2 SEVERE EPISODE OF RECURRENT MAJOR DEPRESSIVE DISORDER, WITHOUT PSYCHOTIC FEATURES (HCC): ICD-10-CM

## 2020-12-14 DIAGNOSIS — E55.9 VITAMIN D DEFICIENCY: ICD-10-CM

## 2020-12-14 DIAGNOSIS — R53.82 CHRONIC FATIGUE: ICD-10-CM

## 2020-12-14 LAB
25(OH)D3 SERPL-MCNC: 13.8 NG/ML (ref 30–100)
ALBUMIN SERPL-MCNC: 4.8 G/DL (ref 3.5–5.2)
ALBUMIN/GLOB SERPL: 1.5 G/DL
ALP SERPL-CCNC: 86 U/L (ref 39–117)
ALT SERPL W P-5'-P-CCNC: 8 U/L (ref 1–33)
ANION GAP SERPL CALCULATED.3IONS-SCNC: 10.5 MMOL/L (ref 5–15)
AST SERPL-CCNC: 17 U/L (ref 1–32)
BASOPHILS # BLD AUTO: 0.02 10*3/MM3 (ref 0–0.2)
BASOPHILS NFR BLD AUTO: 0.4 % (ref 0–1.5)
BILIRUB SERPL-MCNC: 0.2 MG/DL (ref 0–1.2)
BUN SERPL-MCNC: 8 MG/DL (ref 6–20)
BUN/CREAT SERPL: 11.8 (ref 7–25)
CALCIUM SPEC-SCNC: 9.5 MG/DL (ref 8.6–10.5)
CHLORIDE SERPL-SCNC: 100 MMOL/L (ref 98–107)
CHOLEST SERPL-MCNC: 170 MG/DL (ref 0–200)
CO2 SERPL-SCNC: 26.5 MMOL/L (ref 22–29)
CREAT SERPL-MCNC: 0.68 MG/DL (ref 0.57–1)
CRP SERPL-MCNC: 0.04 MG/DL (ref 0–0.5)
DEPRECATED RDW RBC AUTO: 41.1 FL (ref 37–54)
EOSINOPHIL # BLD AUTO: 0.12 10*3/MM3 (ref 0–0.4)
EOSINOPHIL NFR BLD AUTO: 2.2 % (ref 0.3–6.2)
ERYTHROCYTE [DISTWIDTH] IN BLOOD BY AUTOMATED COUNT: 14.3 % (ref 12.3–15.4)
ERYTHROCYTE [SEDIMENTATION RATE] IN BLOOD: 13 MM/HR (ref 0–20)
FERRITIN SERPL-MCNC: 13.7 NG/ML (ref 13–150)
FOLATE SERPL-MCNC: 10.3 NG/ML (ref 4.78–24.2)
GFR SERPL CREATININE-BSD FRML MDRD: 129 ML/MIN/1.73
GLOBULIN UR ELPH-MCNC: 3.1 GM/DL
GLUCOSE SERPL-MCNC: 90 MG/DL (ref 65–99)
HBA1C MFR BLD: 5.6 % (ref 4.8–5.6)
HCT VFR BLD AUTO: 38 % (ref 34–46.6)
HCV AB SER DONR QL: NORMAL
HDLC SERPL-MCNC: 75 MG/DL (ref 40–60)
HGB BLD-MCNC: 12.7 G/DL (ref 12–15.9)
IMM GRANULOCYTES # BLD AUTO: 0.01 10*3/MM3 (ref 0–0.05)
IMM GRANULOCYTES NFR BLD AUTO: 0.2 % (ref 0–0.5)
IRON 24H UR-MRATE: 31 MCG/DL (ref 37–145)
IRON SATN MFR SERPL: 7 % (ref 20–50)
LDLC SERPL CALC-MCNC: 83 MG/DL (ref 0–100)
LDLC/HDLC SERPL: 1.11 {RATIO}
LYMPHOCYTES # BLD AUTO: 1.85 10*3/MM3 (ref 0.7–3.1)
LYMPHOCYTES NFR BLD AUTO: 33.2 % (ref 19.6–45.3)
MCH RBC QN AUTO: 26.5 PG (ref 26.6–33)
MCHC RBC AUTO-ENTMCNC: 33.4 G/DL (ref 31.5–35.7)
MCV RBC AUTO: 79.3 FL (ref 79–97)
MONOCYTES # BLD AUTO: 0.39 10*3/MM3 (ref 0.1–0.9)
MONOCYTES NFR BLD AUTO: 7 % (ref 5–12)
NEUTROPHILS NFR BLD AUTO: 3.18 10*3/MM3 (ref 1.7–7)
NEUTROPHILS NFR BLD AUTO: 57 % (ref 42.7–76)
NRBC BLD AUTO-RTO: 0 /100 WBC (ref 0–0.2)
PLATELET # BLD AUTO: 388 10*3/MM3 (ref 140–450)
PMV BLD AUTO: 10.2 FL (ref 6–12)
POTASSIUM SERPL-SCNC: 5 MMOL/L (ref 3.5–5.2)
PROT SERPL-MCNC: 7.9 G/DL (ref 6–8.5)
RBC # BLD AUTO: 4.79 10*6/MM3 (ref 3.77–5.28)
SODIUM SERPL-SCNC: 137 MMOL/L (ref 136–145)
T4 FREE SERPL-MCNC: 1.16 NG/DL (ref 0.93–1.7)
TIBC SERPL-MCNC: 475 MCG/DL (ref 298–536)
TRANSFERRIN SERPL-MCNC: 319 MG/DL (ref 200–360)
TRIGL SERPL-MCNC: 59 MG/DL (ref 0–150)
TSH SERPL DL<=0.05 MIU/L-ACNC: 1.83 UIU/ML (ref 0.27–4.2)
URATE SERPL-MCNC: 5.1 MG/DL (ref 2.4–5.7)
VIT B12 BLD-MCNC: 308 PG/ML (ref 211–946)
VLDLC SERPL-MCNC: 12 MG/DL (ref 5–40)
WBC # BLD AUTO: 5.57 10*3/MM3 (ref 3.4–10.8)

## 2020-12-14 PROCEDURE — 80061 LIPID PANEL: CPT

## 2020-12-14 PROCEDURE — 82306 VITAMIN D 25 HYDROXY: CPT

## 2020-12-14 PROCEDURE — 86038 ANTINUCLEAR ANTIBODIES: CPT

## 2020-12-14 PROCEDURE — 86140 C-REACTIVE PROTEIN: CPT

## 2020-12-14 PROCEDURE — 85651 RBC SED RATE NONAUTOMATED: CPT

## 2020-12-14 PROCEDURE — 84550 ASSAY OF BLOOD/URIC ACID: CPT

## 2020-12-14 PROCEDURE — 85025 COMPLETE CBC W/AUTO DIFF WBC: CPT

## 2020-12-14 PROCEDURE — 83036 HEMOGLOBIN GLYCOSYLATED A1C: CPT

## 2020-12-14 PROCEDURE — 82728 ASSAY OF FERRITIN: CPT

## 2020-12-14 PROCEDURE — 82607 VITAMIN B-12: CPT

## 2020-12-14 PROCEDURE — 84439 ASSAY OF FREE THYROXINE: CPT

## 2020-12-14 PROCEDURE — 86803 HEPATITIS C AB TEST: CPT

## 2020-12-14 PROCEDURE — 84443 ASSAY THYROID STIM HORMONE: CPT

## 2020-12-14 PROCEDURE — 80053 COMPREHEN METABOLIC PANEL: CPT

## 2020-12-14 PROCEDURE — 83540 ASSAY OF IRON: CPT

## 2020-12-14 PROCEDURE — 82746 ASSAY OF FOLIC ACID SERUM: CPT

## 2020-12-14 PROCEDURE — 84466 ASSAY OF TRANSFERRIN: CPT

## 2020-12-14 PROCEDURE — 86225 DNA ANTIBODY NATIVE: CPT

## 2020-12-15 ENCOUNTER — TELEPHONE (OUTPATIENT)
Dept: FAMILY MEDICINE CLINIC | Facility: CLINIC | Age: 25
End: 2020-12-15

## 2020-12-15 LAB
ANA SER QL: POSITIVE
DSDNA AB SER-ACNC: 2 IU/ML (ref 0–9)
Lab: NORMAL

## 2020-12-15 NOTE — TELEPHONE ENCOUNTER
Mailed letter to inform [facility] that [facility] trying to reach [facility]. I will give her their contact information as well.        HUB to read.

## 2020-12-16 ENCOUNTER — TELEPHONE (OUTPATIENT)
Dept: FAMILY MEDICINE CLINIC | Facility: CLINIC | Age: 25
End: 2020-12-16

## 2020-12-16 RX ORDER — ERGOCALCIFEROL 1.25 MG/1
50000 CAPSULE ORAL
Qty: 4 CAPSULE | Refills: 3 | Status: SHIPPED | OUTPATIENT
Start: 2020-12-16 | End: 2021-02-26 | Stop reason: SDUPTHER

## 2020-12-16 RX ORDER — FERROUS SULFATE 325(65) MG
325 TABLET ORAL
Qty: 30 TABLET | Refills: 3 | Status: SHIPPED | OUTPATIENT
Start: 2020-12-16 | End: 2021-02-26 | Stop reason: SDUPTHER

## 2020-12-16 NOTE — TELEPHONE ENCOUNTER
----- Message from Sandeep Rahman MD sent at 12/15/2020  4:55 PM CST -----  Please call pt     Her MAXIMILIAN test was positive suggesting pt is increased risk for autoimmune disease or connective tissue disorder    Vitamin D levels low and recommend pt start taking Vitamin D3 50,000 units once a week give 4 pills and 3 refills      Vitamin B12 levels normal    Folate levels normal    Thyroid studies normal    Pt has iron deficiency and recommend pt start increaing more iron in diet but also recommend ferrous sulfate 325 mg PO q daily. Give 30 pills and 3 refills take with vitamin C for better absorption also may cause constipation and black stools     Uric acid normal    hga1c normal pt is not diabetic or prediabetic     Lipid panel stable    On CMP kidney function and liver function normal     On cbc hemoglobin stable    Recheck on next visit  Thanks

## 2021-01-08 ENCOUNTER — OFFICE VISIT (OUTPATIENT)
Dept: FAMILY MEDICINE CLINIC | Facility: CLINIC | Age: 26
End: 2021-01-08

## 2021-01-08 ENCOUNTER — LAB (OUTPATIENT)
Dept: LAB | Facility: HOSPITAL | Age: 26
End: 2021-01-08

## 2021-01-08 VITALS
HEIGHT: 67 IN | DIASTOLIC BLOOD PRESSURE: 70 MMHG | SYSTOLIC BLOOD PRESSURE: 110 MMHG | WEIGHT: 257.8 LBS | HEART RATE: 114 BPM | BODY MASS INDEX: 40.46 KG/M2 | TEMPERATURE: 97.1 F | OXYGEN SATURATION: 98 %

## 2021-01-08 DIAGNOSIS — M25.50 MULTIPLE JOINT PAIN: ICD-10-CM

## 2021-01-08 DIAGNOSIS — R76.8 ANA POSITIVE: Primary | ICD-10-CM

## 2021-01-08 DIAGNOSIS — Z82.61 FAMILY HISTORY OF RHEUMATOID ARTHRITIS: ICD-10-CM

## 2021-01-08 DIAGNOSIS — R76.8 ANA POSITIVE: ICD-10-CM

## 2021-01-08 DIAGNOSIS — R53.82 CHRONIC FATIGUE: ICD-10-CM

## 2021-01-08 DIAGNOSIS — E55.9 VITAMIN D DEFICIENCY: ICD-10-CM

## 2021-01-08 PROCEDURE — 86235 NUCLEAR ANTIGEN ANTIBODY: CPT

## 2021-01-08 PROCEDURE — 99214 OFFICE O/P EST MOD 30 MIN: CPT | Performed by: FAMILY MEDICINE

## 2021-01-08 PROCEDURE — 86225 DNA ANTIBODY NATIVE: CPT

## 2021-01-08 PROCEDURE — 86038 ANTINUCLEAR ANTIBODIES: CPT

## 2021-01-08 PROCEDURE — 86200 CCP ANTIBODY: CPT

## 2021-01-08 PROCEDURE — 81374 HLA I TYPING 1 ANTIGEN LR: CPT

## 2021-01-08 NOTE — PATIENT INSTRUCTIONS
Antinuclear Antibody Test  Why am I having this test?  This is a test that is used to help diagnose systemic lupus erythematosus (SLE) and other autoimmune diseases. An autoimmune disease is a disease in which the body's own defense (immune)system attacks its organs.  What is being tested?  This test checks for antinuclear antibodies (MAXIMILIAN) in the blood. The presence of MAXIMILIAN is associated with several autoimmune diseases. It is seen in almost all patients with lupus.  What kind of sample is taken?    A blood sample is required for this test. It is usually collected by inserting a needle into a blood vessel.  How are the results reported?  Your test results will be reported as either positive or negative.  A false-positive result can occur. A false positive is incorrect because it means that a condition is present when it is not.  What do the results mean?  A positive test result may mean that you have:  · Lupus.  · Other autoimmune diseases, such as rheumatoid arthritis, scleroderma, or Sjögren syndrome.  Conditions that may cause a false-positive result include:  · Liver dysfunction.  · Myasthenia gravis.  · Infectious mononucleosis.  Talk with your health care provider about what your results mean.  Questions to ask your health care provider  Ask your health care provider, or the department that is doing the test:  · When will my results be ready?  · How will I get my results?  · What are my treatment options?  · What other tests do I need?  · What are my next steps?  Summary  · This is a test that is used to help diagnose systemic lupus erythematosus (SLE) and other autoimmune diseases. An autoimmune disease is a disease in which the body's own defense (immune)system attacks the body.  · This test checks for antinuclear antibodies (MAXIMILIAN) in the blood. The presence of MAXIMILIAN is associated with several autoimmune diseases. It is seen in almost all patients with lupus.  · Your test results will be reported as either  positive or negative. Talk with your health care provider about what your results mean.  This information is not intended to replace advice given to you by your health care provider. Make sure you discuss any questions you have with your health care provider.  Document Revised: 11/30/2018 Document Reviewed: 08/16/2018  Elsevier Patient Education © 2020 [employer]    Vitamin D Deficiency  Vitamin D deficiency is when your body does not have enough vitamin D. Vitamin D is important to your body for many reasons:  · It helps the body absorb two important minerals--calcium and phosphorus.  · It plays a role in bone health.  · It may help to prevent some diseases, such as diabetes and multiple sclerosis.  · It plays a role in muscle function, including heart function.  If vitamin D deficiency is severe, it can cause a condition in which your bones become soft. In adults, this condition is called osteomalacia. In children, this condition is called rickets.  What are the causes?  This condition may be caused by:  · Not eating enough foods that contain vitamin D.  · Not getting enough natural sun exposure.  · Having certain digestive system diseases that make it difficult for your body to absorb vitamin D. These diseases include Crohn's disease, chronic pancreatitis, and cystic fibrosis.  · Having a surgery in which a part of the stomach or a part of the small intestine is removed.  · Having chronic kidney disease or liver disease.  What increases the risk?  You are more likely to develop this condition if you:  · Are older.  · Do not spend much time outdoors.  · Live in a long-term care facility.  · Have had broken bones.  · Have weak or thin bones (osteoporosis).  · Have a disease or condition that changes how the body absorbs vitamin D.  · Have dark skin.  · Take certain medicines, such as steroid medicines or certain seizure medicines.  · Are overweight or obese.  What are the signs or symptoms?  In mild cases of  vitamin D deficiency, there may not be any symptoms. If the condition is severe, symptoms may include:  · Bone pain.  · Muscle pain.  · Falling often.  · Broken bones caused by a minor injury.  How is this diagnosed?  This condition may be diagnosed with blood tests. Imaging tests such as X-rays may also be done to look for changes in the bone.  How is this treated?  Treatment for this condition may depend on what caused the condition. Treatment options include:  · Taking vitamin D supplements. Your health care provider will suggest what dose is best for you.  · Taking a calcium supplement. Your health care provider will suggest what dose is best for you.  Follow these instructions at home:  Eating and drinking    · Eat foods that contain vitamin D. Choices include:  ? Fortified dairy products, cereals, or juices. Fortified means that vitamin D has been added to the food. Check the label on the package to see if the food is fortified.  ? Fatty fish, such as salmon or trout.  ? Eggs.  ? Oysters.  ? Mushrooms.  The items listed above may not be a complete list of recommended foods and beverages. Contact a dietitian for more information.  General instructions  · Take medicines and supplements only as told by your health care provider.  · Get regular, safe exposure to natural sunlight.  · Do not use a tanning bed.  · Maintain a healthy weight. Lose weight if needed.  · Keep all follow-up visits as told by your health care provider. This is important.  How is this prevented?  You can get vitamin D by:  · Eating foods that naturally contain vitamin D.  · Eating or drinking products that have been fortified with vitamin D, such as cereals, juices, and dairy products (including milk).  · Taking a vitamin D supplement or a multivitamin supplement that contains vitamin D.  · Being in the sun. Your body naturally makes vitamin D when your skin is exposed to sunlight. Your body changes the sunlight into a form of the vitamin  that it can use.  Contact a health care provider if:  · Your symptoms do not go away.  · You feel nauseous or you vomit.  · You have fewer bowel movements than usual or are constipated.  Summary  · Vitamin D deficiency is when your body does not have enough vitamin D.  · Vitamin D is important to your body for good bone health and muscle function, and it may help prevent some diseases.  · Vitamin D deficiency is primarily treated through supplementation. Your health care provider will suggest what dose is best for you.  · You can get vitamin D by eating foods that contain vitamin D, by being in the sun, and by taking a vitamin D supplement or a multivitamin supplement that contains vitamin D.  This information is not intended to replace advice given to you by your health care provider. Make sure you discuss any questions you have with your health care provider.  Document Revised: 08/26/2019 Document Reviewed: 08/26/2019  [employer] Patient Education © 2020 [employer]    Anticyclic-Citrullinated Peptide Antibody Test  Why am I having this test?  You may have the anticyclic-citrullinated peptide antibody test done to help:  · Diagnose rheumatoid arthritis (RA). RA is a long-term (chronic) disease that causes inflammation in the joints.  · Determine the severity of your RA, including how much worse it is getting (progression).  This test may be done if you have unexplained joint inflammation and have previously tested negative for rheumatoid factor. It may also be done if you have been diagnosed with undifferentiated arthritis and your health care provider suspects rheumatoid arthritis.  What is being tested?  This test checks your blood for the presence of anticyclic-citrullinated peptide antibodies. Antibodies are cells that are part of the body's disease-fighting (immune) system. These antibodies appear early in the course of RA and are thought to be directly involved in the progression of the disease.  What kind  of sample is taken?    A blood sample is required for this test. It is usually collected by inserting a needle into a blood vessel.  How are the results reported?  Your test results will be reported as either positive or negative. A result is considered negative if there is less than 20 units of the antibody per mL of blood.  What do the results mean?  · A positive blood test may mean that you have RA.  · A negative blood test means that it is less likely that you have RA. However, a negative test does not completely rule out rheumatoid arthritis.  Talk with your health care provider about what your results mean.  Questions to ask your health care provider  Ask your health care provider, or the department that is doing the test:  · When will my results be ready?  · How will I get my results?  · What are my treatment options?  · What other tests do I need?  · What are my next steps?  Summary  · The anticyclic-citrullinated peptide antibody blood test may be done to help your health care provider diagnose rheumatoid arthritis (RA).  · This test checks your blood for the presence of anticyclic-citrullinated peptide antibodies. These antibodies appear early in the course of RA.  · A positive blood test may mean that you have RA.  This information is not intended to replace advice given to you by your health care provider. Make sure you discuss any questions you have with your health care provider.  Document Revised: 11/30/2018 Document Reviewed: 08/06/2018  Elsevier Patient Education © 2020 [employer]

## 2021-01-11 LAB
ANA SER QL: POSITIVE
CCP IGA+IGG SERPL IA-ACNC: 9 UNITS (ref 0–19)
CENTROMERE B AB SER-ACNC: <0.2 AI (ref 0–0.9)
CHROMATIN AB SERPL-ACNC: <0.2 AI (ref 0–0.9)
DSDNA AB SER-ACNC: 2 IU/ML (ref 0–9)
ENA JO1 AB SER-ACNC: <0.2 AI (ref 0–0.9)
ENA RNP AB SER-ACNC: 4.4 AI (ref 0–0.9)
ENA SCL70 AB SER-ACNC: <0.2 AI (ref 0–0.9)
ENA SM AB SER-ACNC: <0.2 AI (ref 0–0.9)
ENA SS-A AB SER-ACNC: <0.2 AI (ref 0–0.9)
ENA SS-B AB SER-ACNC: <0.2 AI (ref 0–0.9)
Lab: ABNORMAL

## 2021-01-14 LAB — HLA-B27 QL NAA+PROBE: NEGATIVE

## 2021-02-18 NOTE — PROGRESS NOTES
Chief Complaint  Follow-up    Subjective          Loren Sharif presents to [facility]            Pt is 24 yo female with management of iron deficiency, obesity, major depression, allergic rhinitis, prediabetes, vitamin D deficiency, former tobacco smoker. Multiple joint pain. MTHFR gene mutation postpartum depression. MAXIMILIAN positive,      1/8/21 in office visit for recheck on pt's above medical issues . Pt had labwork done on 12/14/20 that whoed normal uric acid CRP and ESR were normal AntiDS DNA negative hep C antibody test negative.vitamin B12 levels normal. Iron profile showed iron at 31 with iron saturation at 7.  Vitamin B12 levesl normal. CMP showed stable kidney and liver function. CBC showed stable hemoglobin and WBC pt continues to have pain in both back and knees and hands.  She does have a family history of RA (grandmother).  She has changed her diet by eating more healthy.   She is stiff in the morning but her pain gets better in joint as day progresses     2/27/21 in office visit for recheck on pt's above medical issues. Pt was referred to Rheumatology last visit due to being MAXIMILIAN positive and positive for RNP antibodies.and a family history of RA Pt continues to take her medicaitons for iron deficiency,vitamin D deficiency and major depression. She continues to have pain in her joints. She states that mostly her hands and knees hurt. She has tried to modify her diet and eat more probiotics and eat healthy. Certain foods like tomatoes and caffeine make her stomach feel more irritable. She also has epigastric tenderness and has not had EGD done before.    Abdominal Pain  This is a chronic problem. The current episode started more than 1 year ago. The onset quality is sudden. The problem occurs constantly. The problem has been unchanged. The pain is located in the epigastric region. The pain is at a severity of 4/10. The pain is moderate. The quality of the pain is  aching. The abdominal pain does not radiate. Associated symptoms include arthralgias, belching and frequency. Pertinent negatives include no anorexia, constipation, diarrhea, dysuria, fever, flatus, headaches, hematochezia, hematuria, melena, myalgias, vomiting or weight loss. The pain is aggravated by belching and eating. The pain is relieved by nothing. She has tried nothing for the symptoms. The treatment provided no relief. There is no history of abdominal surgery, colon cancer, Crohn's disease, gallstones, GERD, irritable bowel syndrome, pancreatitis, PUD or ulcerative colitis.   Knee Pain   The incident occurred more than 1 week ago. The incident occurred at work. There was no injury mechanism. The pain is present in the left knee and right knee. The quality of the pain is described as aching. The pain is at a severity of 4/10. The pain is moderate. The pain has been worsening since onset. Associated symptoms include muscle weakness and numbness. Pertinent negatives include no inability to bear weight, loss of motion, loss of sensation or tingling. The symptoms are aggravated by movement, palpation and weight bearing. She has tried nothing for the symptoms. The treatment provided no relief.   Hand Pain   The incident occurred more than 1 week ago. There was no injury mechanism. The pain is present in the left hand and right hand. The quality of the pain is described as aching. The pain does not radiate. The pain is at a severity of 3/10. The pain is moderate. Associated symptoms include muscle weakness and numbness. Pertinent negatives include no chest pain or tingling. Nothing aggravates the symptoms. She has tried nothing for the symptoms. The treatment provided no relief.   Obesity   This is a chronic problem. The problem occurs constantly. The problem has been unchanged. Pertinent negatives include no abdominal pain, anorexia, arthralgias, change in bowel habit, chest  pain, chills, congestion, coughing, diaphoresis, fatigue, fever, headaches, joint swelling, myalgias, nausea, neck pain, numbness, rash, sore throat, swollen glands, urinary symptoms, vertigo, visual change, vomiting or weakness. Nothing aggravates the symptoms. She has tried nothing for the symptoms. The treatment provided no relief.   Fatigue   This is a chronic problem. The current episode started more than 1 year ago. The problem occurs constantly. The problem has been waxing and waning. Pertinent negatives include no abdominal pain, anorexia, arthralgias, change in bowel habit, chest pain, chills, congestion, coughing, diaphoresis, fatigue, fever, headaches, joint swelling, myalgias, nausea, neck pain, numbness, rash, sore throat, swollen glands, urinary symptoms, vertigo, visual change, vomiting or weakness. Treatments tried: iron therapy b12 injections.   The treatment provided mild relief.   Depression   Visit Type: follow-up  Patient presents with the following symptoms: anhedonia, decreased concentration, depressed mood, excessive worry, fatigue, feelings of hopelessness, feelings of worthlessness, irritability, memory impairment, nervousness/anxiety, panic, restlessness and weight gain.  Patient is not experiencing: chest pain, choking sensation, compulsions, confusion, dizziness, dry mouth, hypersomnia, hyperventilation, impotence, insomnia, malaise, muscle tension, nausea, obsessions, palpitations, psychomotor agitation, psychomotor retardation, shortness of breath, suicidal ideas, suicidal planning and thoughts of death.  Frequency of symptoms: constantly   Severity: moderate   Sleep quality: fair    Review of Systems   Constitutional: Positive for activity change and fatigue. Negative for fever and unexpected weight loss.   Gastrointestinal: Positive for abdominal pain. Negative for anorexia, constipation, diarrhea, flatus, hematochezia, melena and vomiting.   Genitourinary: Positive for frequency.  "Negative for dysuria and hematuria.   Musculoskeletal: Positive for arthralgias. Negative for myalgias.   Neurological: Positive for weakness and numbness.   Psychiatric/Behavioral: The patient is nervous/anxious.         Depressed mood      Objective   Vital Signs:   /82 (BP Location: Right arm, Patient Position: Sitting, Cuff Size: Large Adult)   Pulse 70   Temp 96.6 °F (35.9 °C)   Ht 170.2 cm (67\")   Wt 122 kg (270 lb)   LMP 02/21/2021   SpO2 98%   BMI 42.29 kg/m²       Physical Exam  Vitals signs and nursing note reviewed.   Constitutional:       Appearance: She is well-developed. She is not diaphoretic.   HENT:      Head: Normocephalic and atraumatic.      Right Ear: External ear normal.   Eyes:      Conjunctiva/sclera: Conjunctivae normal.      Pupils: Pupils are equal, round, and reactive to light.   Neck:      Musculoskeletal: Normal range of motion and neck supple.   Cardiovascular:      Rate and Rhythm: Normal rate and regular rhythm.      Heart sounds: Normal heart sounds. No murmur.   Pulmonary:      Effort: Pulmonary effort is normal. No respiratory distress.      Breath sounds: Normal breath sounds.   Abdominal:      General: Bowel sounds are normal. There is no distension.      Palpations: Abdomen is soft.      Tenderness: There is no abdominal tenderness.          Comments: Obese abdomen    Musculoskeletal:         General: Tenderness present. No deformity.      Right knee: She exhibits decreased range of motion. Tenderness found. Medial joint line and lateral joint line tenderness noted.      Left knee: She exhibits decreased range of motion. Tenderness found. Medial joint line tenderness noted.   Skin:     General: Skin is warm.      Coloration: Skin is not pale.      Findings: No erythema or rash.   Neurological:      Mental Status: She is alert and oriented to person, place, and time.      Cranial Nerves: No cranial nerve deficit.   Psychiatric:         Behavior: Behavior normal.     "        Result Review :                 Assessment and Plan    Diagnoses and all orders for this visit:    1. Epigastric pain (Primary)  -     Ambulatory Referral to Gastroenterology    2. Prediabetes    3. Vitamin D deficiency    4. MAXIMILIAN positive    5. Family history of rheumatoid arthritis    6. Current moderate episode of major depressive disorder without prior episode (CMS/HCC)    7. Morbid obesity (CMS/HCC)    8. Iron deficiency    9. Gastroesophageal reflux disease, unspecified whether esophagitis present  -     Ambulatory Referral to Gastroenterology    Other orders  -     ferrous sulfate (FerrouSul) 325 (65 FE) MG tablet; Take 1 tablet by mouth Daily With Breakfast.  Dispense: 30 tablet; Refill: 3  -     sertraline (ZOLOFT) 25 MG tablet; Take 1 tablet by mouth Daily.  Dispense: 30 tablet; Refill: 3  -     vitamin D (ERGOCALCIFEROL) 1.25 MG (05498 UT) capsule capsule; Take 1 capsule by mouth Every 7 (Seven) Days.  Dispense: 4 capsule; Refill: 3            -went over labwork   -recommend influenza vaccination -pt declined   -hep C screening hep C antibody test   -recommend HPV vaccination  -pt declined   -elevated blood pressure continue to monitor   -GERD/epigastric pain - refer to GI. Possible hiatal hernia/vs gastritis vs esophagitis.  Start on pepcid 20 mg PO BID. Gave diet information today.    -MAXIMILIAN positive. - recommend rheumatology referral. Will get MAXIMILIAN comprehensive panel. Repeat MAXIMILIAN test.  Also check [id number] antibodies   -multiple joint pain/hand pain/knee pain  - will get MAXIMILIAN test, check ESR and CRP. Consider EMG study with Neurology if not improving.    -MTHFR gene mutation - check folic acid  -prediabetes - prediabetes meal plan   -dyslipdiemia-  ASCVD risk low. Recommend DASH diet, heart healthy diet. .    -vitamin D deficiency - gave information on Vitamin D to go home with. Recommend Vitamin D once a week   -referral to Sleep Medicine for fatigue. Services appreciated  -major depression - was on  wellbutrin  mg daily. Now on zoloft 25 mg daily.  Recommend counseling with telehealth   -exercise induced asthma - was on albuterol  inhaler. singulair at bedtime   -allergic rhinitis-   was on flonase PRN along with singulair 5 mg at bedtime. Drug information provided  -morbid obesity -weight loss information provided.  Counseled >5 minutes BMI at 42.29   -advised pt to be safe and call with questions and concerns  -advised pt to go to ER or call 911 if symptoms worrisome or severe  -advised pt to followup with specialist and referrals  -advised pt to be safe during COVID-19 pandemic  -total time with pt >25 minutes   -recheck in 6 weeks     Follow Up   Return in about 6 weeks (around 4/9/2021) for 6.  Patient was given instructions and counseling regarding her condition or for health maintenance advice. Please see specific information pulled into the AVS if appropriate.

## 2021-02-26 ENCOUNTER — OFFICE VISIT (OUTPATIENT)
Dept: FAMILY MEDICINE CLINIC | Facility: CLINIC | Age: 26
End: 2021-02-26

## 2021-02-26 VITALS
TEMPERATURE: 96.6 F | DIASTOLIC BLOOD PRESSURE: 82 MMHG | HEIGHT: 67 IN | HEART RATE: 70 BPM | WEIGHT: 270 LBS | BODY MASS INDEX: 42.38 KG/M2 | SYSTOLIC BLOOD PRESSURE: 134 MMHG | OXYGEN SATURATION: 98 %

## 2021-02-26 DIAGNOSIS — R76.8 ANA POSITIVE: ICD-10-CM

## 2021-02-26 DIAGNOSIS — R73.03 PREDIABETES: ICD-10-CM

## 2021-02-26 DIAGNOSIS — E61.1 IRON DEFICIENCY: ICD-10-CM

## 2021-02-26 DIAGNOSIS — F32.1 CURRENT MODERATE EPISODE OF MAJOR DEPRESSIVE DISORDER WITHOUT PRIOR EPISODE (HCC): ICD-10-CM

## 2021-02-26 DIAGNOSIS — E55.9 VITAMIN D DEFICIENCY: ICD-10-CM

## 2021-02-26 DIAGNOSIS — Z82.61 FAMILY HISTORY OF RHEUMATOID ARTHRITIS: ICD-10-CM

## 2021-02-26 DIAGNOSIS — K21.9 GASTROESOPHAGEAL REFLUX DISEASE, UNSPECIFIED WHETHER ESOPHAGITIS PRESENT: ICD-10-CM

## 2021-02-26 DIAGNOSIS — R10.13 EPIGASTRIC PAIN: Primary | ICD-10-CM

## 2021-02-26 DIAGNOSIS — E66.01 MORBID OBESITY (HCC): ICD-10-CM

## 2021-02-26 PROCEDURE — 99214 OFFICE O/P EST MOD 30 MIN: CPT | Performed by: FAMILY MEDICINE

## 2021-02-26 RX ORDER — FERROUS SULFATE 325(65) MG
325 TABLET ORAL
Qty: 30 TABLET | Refills: 3 | Status: SHIPPED | OUTPATIENT
Start: 2021-02-26 | End: 2021-04-07 | Stop reason: SDUPTHER

## 2021-02-26 RX ORDER — SERTRALINE HYDROCHLORIDE 25 MG/1
25 TABLET, FILM COATED ORAL DAILY
Qty: 30 TABLET | Refills: 3 | Status: SHIPPED | OUTPATIENT
Start: 2021-02-26 | End: 2021-04-07 | Stop reason: SDUPTHER

## 2021-02-26 RX ORDER — ERGOCALCIFEROL 1.25 MG/1
50000 CAPSULE ORAL
Qty: 4 CAPSULE | Refills: 3 | Status: SHIPPED | OUTPATIENT
Start: 2021-02-26 | End: 2021-04-07 | Stop reason: SDUPTHER

## 2021-02-26 NOTE — PATIENT INSTRUCTIONS
Heartburn  Heartburn is a type of pain or discomfort that can happen in the throat or chest. It is often described as a burning pain. It may also cause a bad, acid-like taste in the mouth. Heartburn may feel worse when you lie down or bend over, and it is often worse at night. Heartburn may be caused by stomach contents that move back up into the esophagus (reflux).  Follow these instructions at home:  Eating and drinking    · Avoid certain foods and drinks as told by your health care provider. This may include:  ? Coffee and tea (with or without caffeine).  ? Drinks that contain alcohol.  ? Energy drinks and sports drinks.  ? Carbonated drinks or sodas.  ? Chocolate and cocoa.  ? Peppermint and mint flavorings.  ? Garlic and onions.  ? Horseradish.  ? Spicy and acidic foods, including peppers, chili powder, denis powder, vinegar, hot sauces, and barbecue sauce.  ? Citrus fruit juices and citrus fruits, such as oranges, adan, and limes.  ? Tomato-based foods, such as red sauce, chili, salsa, and pizza with red sauce.  ? Fried and fatty foods, such as donuts, french fries, potato chips, and high-fat dressings.  ? High-fat meats, such as hot dogs and fatty cuts of red and white meats, such as rib eye steak, sausage, ham, and jain.  ? High-fat dairy items, such as whole milk, butter, and cream cheese.  · Eat small, frequent meals instead of large meals.  · Avoid drinking large amounts of liquid with your meals.  · Avoid eating meals during the 2-3 hours before bedtime.  · Avoid lying down right after you eat.  · Do not exercise right after you eat.  Lifestyle         · If you are overweight, reduce your weight to an amount that is healthy for you. Ask your health care provider for guidance about a safe weight loss goal.  · Do not use any products that contain nicotine or tobacco, such as cigarettes, e-cigarettes, and chewing tobacco. These can make your symptoms worse. If you need help quitting, ask your health care  provider.  · Wear loose-fitting clothing. Do not wear anything tight around your waist that causes pressure on your abdomen.  · Raise (elevate) the head of your bed about 6 inches (15 cm) when you sleep.  · Try to reduce your stress, such as with yoga or meditation. If you need help reducing stress, ask your health care provider.  General instructions  · Pay attention to any changes in your symptoms.  · Take over-the-counter and prescription medicines only as told by your health care provider.  ? Do not take aspirin, ibuprofen, or other NSAIDs unless your health care provider told you to do so.  ? Stop medicines only as told by your health care provider. If you stop taking some medicines too quickly, your symptoms may get worse.  · Keep all follow-up visits as told by your health care provider. This is important.  Contact a health care provider if:  · You have new symptoms.  · You have unexplained weight loss.  · You have difficulty swallowing, or it hurts to swallow.  · You have wheezing or a persistent cough.  · Your symptoms do not improve with treatment.  · You have frequent heartburn for more than 2 weeks.  Get help right away if:  · You have pain in your arms, neck, jaw, teeth, or back.  · You feel sweaty, dizzy, or light-headed.  · You have chest pain or shortness of breath.  · You vomit and your vomit looks like blood or coffee grounds.  · Your stool is bloody or black.  These symptoms may represent a serious problem that is an emergency. Do not wait to see if the symptoms will go away. Get medical help right away. Call your local emergency services (911 in the [place] Do not drive yourself to the hospital.  Summary  · Heartburn is a type of pain or discomfort that can happen in the throat or chest. It is often described as a burning pain. It may also cause a bad, acid-like taste in the mouth.  · Avoid certain foods and drinks as told by your health care provider.  · Take over-the-counter and prescription  medicines only as told by your health care provider. Do not take aspirin, ibuprofen, or other NSAIDs unless your health care provider told you to do so.  · Contact a health care provider if your symptoms do not improve or they get worse.  This information is not intended to replace advice given to you by your health care provider. Make sure you discuss any questions you have with your health care provider.  Document Revised: 05/20/2019 Document Reviewed: 05/20/2019  [employer] Patient Education © 2020 [employer]    Food Choices for Gastroesophageal Reflux Disease, Adult  When you have gastroesophageal reflux disease (GERD), the foods you eat and your eating habits are very important. Choosing the right foods can help ease your discomfort. Think about working with a nutrition specialist (dietitian) to help you make good choices.  What are tips for following this plan?    Meals  · Choose healthy foods that are low in fat, such as fruits, vegetables, whole grains, low-fat dairy products, and lean meat, fish, and poultry.  · Eat small meals often instead of 3 large meals a day. Eat your meals slowly, and in a place where you are relaxed. Avoid bending over or lying down until 2-3 hours after eating.  · Avoid eating meals 2-3 hours before bed.  · Avoid drinking a lot of liquid with meals.  · Cook foods using methods other than frying. Bake, grill, or broil food instead.  · Avoid or limit:  ? Chocolate.  ? Peppermint or spearmint.  ? Alcohol.  ? Pepper.  ? Black and decaffeinated coffee.  ? Black and decaffeinated tea.  ? Bubbly (carbonated) soft drinks.  ? Caffeinated energy drinks and soft drinks.  · Limit high-fat foods such as:  ? Fatty meat or fried foods.  ? Whole milk, cream, butter, or ice cream.  ? Nuts and nut butters.  ? Pastries, donuts, and sweets made with butter or shortening.  · Avoid foods that cause symptoms. These foods may be different for everyone. Common foods that cause symptoms  include:  ? Tomatoes.  ? Oranges, daan, and limes.  ? Peppers.  ? Spicy food.  ? Onions and garlic.  ? Vinegar.  Lifestyle  · Maintain a healthy weight. Ask your doctor what weight is healthy for you. If you need to lose weight, work with your doctor to do so safely.  · Exercise for at least 30 minutes for 5 or more days each week, or as told by your doctor.  · Wear loose-fitting clothes.  · Do not smoke. If you need help quitting, ask your doctor.  · Sleep with the head of your bed higher than your feet. Use a wedge under the mattress or blocks under the bed frame to raise the head of the bed.  Summary  · When you have gastroesophageal reflux disease (GERD), food and lifestyle choices are very important in easing your symptoms.  · Eat small meals often instead of 3 large meals a day. Eat your meals slowly, and in a place where you are relaxed.  · Limit high-fat foods such as fatty meat or fried foods.  · Avoid bending over or lying down until 2-3 hours after eating.  · Avoid peppermint and spearmint, caffeine, alcohol, and chocolate.  This information is not intended to replace advice given to you by your health care provider. Make sure you discuss any questions you have with your health care provider.  Document Revised: 04/09/2020 Document Reviewed: 01/23/2018  [employer] Patient Education © 2020 [employer]

## 2021-04-01 ENCOUNTER — OFFICE VISIT (OUTPATIENT)
Dept: GASTROENTEROLOGY | Facility: CLINIC | Age: 26
End: 2021-04-01

## 2021-04-01 VITALS
WEIGHT: 258 LBS | BODY MASS INDEX: 40.49 KG/M2 | HEART RATE: 69 BPM | HEIGHT: 67 IN | SYSTOLIC BLOOD PRESSURE: 145 MMHG | DIASTOLIC BLOOD PRESSURE: 110 MMHG

## 2021-04-01 DIAGNOSIS — R10.84 GENERALIZED ABDOMINAL PAIN: ICD-10-CM

## 2021-04-01 DIAGNOSIS — R19.4 CHANGE IN BOWEL HABITS: Primary | ICD-10-CM

## 2021-04-01 DIAGNOSIS — K21.9 GASTROESOPHAGEAL REFLUX DISEASE, UNSPECIFIED WHETHER ESOPHAGITIS PRESENT: ICD-10-CM

## 2021-04-01 DIAGNOSIS — R19.7 DIARRHEA, UNSPECIFIED TYPE: ICD-10-CM

## 2021-04-01 DIAGNOSIS — R11.0 NAUSEA: ICD-10-CM

## 2021-04-01 PROBLEM — R10.13 EPIGASTRIC PAIN: Status: ACTIVE | Noted: 2021-04-01

## 2021-04-01 PROCEDURE — 99213 OFFICE O/P EST LOW 20 MIN: CPT | Performed by: NURSE PRACTITIONER

## 2021-04-01 RX ORDER — SODIUM, POTASSIUM,MAG SULFATES 17.5-3.13G
2 SOLUTION, RECONSTITUTED, ORAL ORAL EVERY 12 HOURS
Qty: 177 ML | Refills: 0 | Status: SHIPPED | OUTPATIENT
Start: 2021-04-01 | End: 2021-04-01 | Stop reason: RX

## 2021-04-01 RX ORDER — DEXTROSE AND SODIUM CHLORIDE 5; .45 G/100ML; G/100ML
30 INJECTION, SOLUTION INTRAVENOUS CONTINUOUS PRN
Status: CANCELLED | OUTPATIENT
Start: 2021-04-21

## 2021-04-01 NOTE — PROGRESS NOTES
Chief Complaint   Patient presents with   • Heartburn   • Abdominal Pain       Subjective    Loren Sharif is a 25 y.o. female. she is here today  25-year-old female presents for follow-up regarding abdominal pain diarrhea that has been ongoing for the last 6 months reports it occurs about 5-6 times per day at onset had some nocturnal stooling but reports now it is always throughout the day.  Denies any melena or hematochezia.    Heartburn  She complains of abdominal pain, early satiety, globus sensation, heartburn and nausea. She reports no belching, no chest pain, no choking, no coughing, no dysphagia or no hoarse voice. This is a chronic problem. The current episode started more than 1 month ago (about 6 months ago ). The problem has been gradually worsening. The heartburn wakes her from sleep. The heartburn limits her activity. The heartburn changes with position. The symptoms are aggravated by certain foods, exertion and lying down. Pertinent negatives include no anemia, fatigue, melena, muscle weakness, orthopnea or weight loss. Risk factors include obesity. She has tried an antacid and a diet change (cut out dairy, grains, red meat caffiene ) for the symptoms.   Abdominal Pain  This is a chronic problem. The current episode started more than 1 month ago. The problem occurs daily. The problem has been gradually worsening. The pain is located in the epigastric region. The quality of the pain is cramping and a sensation of fullness. Associated symptoms include anorexia, diarrhea (about 5-6 times per day ) and nausea. Pertinent negatives include no arthralgias, belching, constipation, dysuria, fever, flatus, frequency, headaches, hematochezia, hematuria, melena, myalgias, vomiting or weight loss. The pain is aggravated by eating (spicy foods ). The pain is relieved by bowel movements (feels like never done going to bathroom ). Her past medical history is significant for GERD.     Plan; schedule patient  for EGD and colonoscopy due to abdominal pain change in bowel habits diarrhea, chronic reflux.  Will obtain biopsies to rule out inflammatory bowel disease.  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 No pertinent surgical history.  Family History   Problem Relation Age of Onset   • Anxiety disorder Mother    • Depression Mother    • Anxiety disorder Maternal Grandmother    • Arthritis Maternal Grandmother    • Asthma Maternal Grandmother    • Cancer Maternal Grandmother    • COPD Maternal Grandmother    • Depression Maternal Grandmother    • Diabetes Maternal Grandmother    • Heart disease Maternal Grandmother    • Hypertension Maternal Grandmother      OB History    No obstetric history on file.       Prior to Admission medications    Medication Sig Start Date End Date Taking? Authorizing Provider   ferrous sulfate (FerrouSul) 325 (65 FE) MG tablet Take 1 tablet by mouth Daily With Breakfast. 2/26/21  Yes Sandeep Rahman MD   sertraline (ZOLOFT) 25 MG tablet Take 1 tablet by mouth Daily. 2/26/21  Yes Sandeep Rahman MD   vitamin D (ERGOCALCIFEROL) 1.25 MG (33603 UT) capsule capsule Take 1 capsule by mouth Every 7 (Seven) Days. 2/26/21  Yes Sandeep Rahman MD     No Known Allergies  Social History     Socioeconomic History   • Marital status: Single     Spouse name: Not on file   • Number of children: Not on file   • Years of education: Not on file   • Highest education level: Not on file   Tobacco Use   • Smoking status: Former Smoker   • Smokeless tobacco: Never Used   Vaping Use   • Vaping Use: Never used   Substance and Sexual Activity   • Alcohol use: Yes       Review of Systems  Review of Systems   Constitutional: Negative for  "fatigue, fever and weight loss.   HENT: Negative for hoarse voice.    Respiratory: Negative for cough and choking.    Cardiovascular: Negative for chest pain.   Gastrointestinal: Positive for abdominal pain, anorexia, diarrhea (about 5-6 times per day ), heartburn and nausea. Negative for constipation, dysphagia, flatus, hematochezia, melena and vomiting.   Genitourinary: Negative for dysuria, frequency and hematuria.   Musculoskeletal: Negative for arthralgias, myalgias and muscle weakness.   Neurological: Negative for headaches.        BP (!) 145/110 (BP Location: Left arm)   Pulse 69   Ht 170.2 cm (67\")   Wt 117 kg (258 lb)   BMI 40.41 kg/m²     Objective    Physical Exam  Constitutional:       General: She is not in acute distress.     Appearance: Normal appearance. She is well-developed.   HENT:      Head: Normocephalic and atraumatic.   Neck:      Thyroid: No thyromegaly.   Cardiovascular:      Rate and Rhythm: Normal rate and regular rhythm.      Heart sounds: Normal heart sounds.   Pulmonary:      Effort: Pulmonary effort is normal.      Breath sounds: Normal breath sounds. No wheezing, rhonchi or rales.   Abdominal:      General: Bowel sounds are normal. There is no distension.      Palpations: Abdomen is soft. Abdomen is not rigid.      Tenderness: There is generalized abdominal tenderness. There is no guarding.      Hernia: No hernia is present.   Musculoskeletal:      Cervical back: Normal range of motion and neck supple.   Lymphadenopathy:      Cervical: No cervical adenopathy.   Skin:     General: Skin is warm and dry.      Coloration: Skin is not pale.      Findings: No rash.   Neurological:      Mental Status: She is alert and oriented to person, place, and time.   Psychiatric:         Speech: Speech normal.         Behavior: Behavior is cooperative.       Lab on 01/08/2021   Component Date Value Ref Range Status   • MAXIMILIAN Direct 01/08/2021 Positive* Negative Final   • Anti-DNA (DS) Ab Qn " 2021 2  0 - 9 IU/mL Final                                       Negative      <5                                     Equivocal  5 - 9                                     Positive      >9   • RNP Antibodies 2021 4.4* 0.0 - 0.9 AI Final   • Mendoza Antibodies 2021 <0.2  0.0 - 0.9 AI Final   • Antiscleroderma-70 Antibodies 2021 <0.2  0.0 - 0.9 AI Final   • GAMALIEL SSA (RO) Ab 2021 <0.2  0.0 - 0.9 AI Final   • GAMALIEL SSB (LA) Ab 2021 <0.2  0.0 - 0.9 AI Final   • Antichromatin Antibodies 2021 <0.2  0.0 - 0.9 AI Final   • RUDDY-1 IgG 2021 <0.2  0.0 - 0.9 AI Final   • Anti-Centromere B Antibodies 2021 <0.2  0.0 - 0.9 AI Final   • See below: 2021 Comment   Final    Comment: Autoantibody                       Disease Association  ------------------------------------------------------------                          Condition                  Frequency  ---------------------   ------------------------   ---------  Antinuclear Antibody,    SLE, mixed connective  Direct (MAIXMILIAN-D)           tissue diseases  ---------------------   ------------------------   ---------  dsDNA                    SLE                        40 - 60%  ---------------------   ------------------------   ---------  Chromatin                Drug induced SLE                90%                           SLE                        48 - 97%  ---------------------   ------------------------   ---------  SSA (Ro)                 SLE                        25 - 35%                           Sjogren's Syndrome         40 - 70%                            Lupus                 100%  ---------------------   ------------------------   ---------  SSB (La)                 SLE                                                        10%                           Sjogren's Syndrome              30%  ---------------------   -----------------------    ---------  Sm (anti-Smith)          SLE                        15 -  30%  ---------------------   -----------------------    ---------  RNP                      Mixed Connective Tissue                           Disease                         95%  (U1 nRNP,                SLE                        30 - 50%  anti-ribonucleoprotein)  Polymyositis and/or                           Dermatomyositis                 20%  ---------------------   ------------------------   ---------  Scl-70 (antiDNA          Scleroderma (diffuse)      20 - 35%  topoisomerase)           Crest                           13%  ---------------------   ------------------------   ---------  Em-1                     Polymyositis and/or                           Dermatomyositis            20 - 40%  ---------------------   ------------------------   ---------  Centromere B             Scleroderma -                            Crest                           variant                         80%   • HLA B27 01/08/2021 Negative   Final    HLA-B*27 Negative  B27 allele interpretation for all loci based on IMGT/HLA  database version 3.38  This test was developed and its performance characteristics  determined by [facility].  It has not been cleared or approved  by the Food and Drug Administration.  [facility] CLIA ID Number [id number]  This test was performed using PCR (Polymerase Chain Reaction)/SSOP  (Sequence Specific Oligonucleotide Probes) technique.  SBT (Sequence  Based Typing) and/or SSP (Sequence Specific Primers) may be used as  supplemental methods when necessary.  Please contact [facility] Customer  Service at [phone] if you have any questions.   Director of [facility]   Dr Jasper Patterson, PhD   • CCP Antibodies IgG/IgA 01/08/2021 9  0 - 19 units Final                              Negative               <20                            Weak positive      20 - 39                            Moderate positive  40 - 59                            Strong positive        >59     Assessment/Plan      1. Change in bowel habits     2. Diarrhea, unspecified type    3. Generalized abdominal pain    4. Gastroesophageal reflux disease, unspecified whether esophagitis present    5. Nausea    .       Orders placed during this encounter include:  Orders Placed This Encounter   Procedures   • US Gallbladder     Standing Status:   Future     Standing Expiration Date:   4/1/2022     Order Specific Question:   Reason for Exam:     Answer:   nausea abd pain   • Follow Anesthesia Guidelines / Standing Orders     Standing Status:   Future   • Obtain Informed Consent     Standing Status:   Future     Order Specific Question:   Informed Consent Given For     Answer:   ESOPHAGOGASTRODUODENOSCOPY and Colonoscopy       ESOPHAGOGASTRODUODENOSCOPY (N/A), COLONOSCOPY (N/A)    Review and/or summary of lab tests, radiology, procedures, medications. Review and summary of old records and obtaining of history. The risks and benefits of my recommendations, as well as other treatment options were discussed with the patient today. Questions were answered.    No orders of the defined types were placed in this encounter.      Follow-up: Return in about 4 weeks (around 4/29/2021) for Recheck, After test.          This document has been electronically signed by KAPIL Menon on April 1, 2021 15:37 CDT           I spent 16 minutes caring for Loren on this date of service. This time includes time spent by me in the following activities:preparing for the visit, reviewing tests, obtaining and/or reviewing a separately obtained history, performing a medically appropriate examination and/or evaluation , counseling and educating the patient/family/caregiver, ordering medications, tests, or procedures, referring and communicating with other health care professionals , documenting information in the medical record and care coordination    Results for orders placed or performed in visit on 01/08/21   MAXIMILIAN Comprehensive Panel    Specimen: Blood   Result Value Ref Range    Anti-DNA  (DS) Ab Qn 2 0 - 9 IU/mL    RNP Antibodies 4.4 (H) 0.0 - 0.9 AI    Mendoza Antibodies <0.2 0.0 - 0.9 AI    Antiscleroderma-70 Antibodies <0.2 0.0 - 0.9 AI    GAMALIEL SSA (RO) Ab <0.2 0.0 - 0.9 AI    GAMALIEL SSB (LA) Ab <0.2 0.0 - 0.9 AI    Antichromatin Antibodies <0.2 0.0 - 0.9 AI    RUDDY-1 IgG <0.2 0.0 - 0.9 AI    Anti-Centromere B Antibodies <0.2 0.0 - 0.9 AI    See below: Comment    HLA-B27 Antigen    Specimen: Blood   Result Value Ref Range    HLA B27 Negative    Cyclic Citrul Peptide Antibody, IgG / IgA    Specimen: Blood   Result Value Ref Range    CCP Antibodies IgG/IgA 9 0 - 19 units   MAXIMILIAN    Specimen: Blood   Result Value Ref Range    MAXIMILIAN Direct Positive (A) Negative   Results for orders placed or performed in visit on 12/14/20   DNA / DS    Specimen: Blood   Result Value Ref Range    Anti-DNA (DS) Ab Qn 2 0 - 9 IU/mL    See below: Comment    CBC Auto Differential    Specimen: Blood   Result Value Ref Range    WBC 5.57 3.40 - 10.80 10*3/mm3    RBC 4.79 3.77 - 5.28 10*6/mm3    Hemoglobin 12.7 12.0 - 15.9 g/dL    Hematocrit 38.0 34.0 - 46.6 %    MCV 79.3 79.0 - 97.0 fL    MCH 26.5 (L) 26.6 - 33.0 pg    MCHC 33.4 31.5 - 35.7 g/dL    RDW 14.3 12.3 - 15.4 %    RDW-SD 41.1 37.0 - 54.0 fl    MPV 10.2 6.0 - 12.0 fL    Platelets 388 140 - 450 10*3/mm3    Neutrophil % 57.0 42.7 - 76.0 %    Lymphocyte % 33.2 19.6 - 45.3 %    Monocyte % 7.0 5.0 - 12.0 %    Eosinophil % 2.2 0.3 - 6.2 %    Basophil % 0.4 0.0 - 1.5 %    Immature Grans % 0.2 0.0 - 0.5 %    Neutrophils, Absolute 3.18 1.70 - 7.00 10*3/mm3    Lymphocytes, Absolute 1.85 0.70 - 3.10 10*3/mm3    Monocytes, Absolute 0.39 0.10 - 0.90 10*3/mm3    Eosinophils, Absolute 0.12 0.00 - 0.40 10*3/mm3    Basophils, Absolute 0.02 0.00 - 0.20 10*3/mm3    Immature Grans, Absolute 0.01 0.00 - 0.05 10*3/mm3    nRBC 0.0 0.0 - 0.2 /100 WBC   Hepatitis C antibody    Specimen: Blood   Result Value Ref Range    Hepatitis C Ab Non-Reactive Non-Reactive   Iron Profile    Specimen: Blood   Result  Value Ref Range    Iron 31 (L) 37 - 145 mcg/dL    Iron Saturation 7 (L) 20 - 50 %    Transferrin 319 200 - 360 mg/dL    TIBC 475 298 - 536 mcg/dL   Vitamin D 25 Hydroxy    Specimen: Blood   Result Value Ref Range    25 Hydroxy, Vitamin D 13.8 (L) 30.0 - 100.0 ng/ml   Sedimentation rate, automated    Specimen: Blood   Result Value Ref Range    Sed Rate 13 0 - 20 mm/hr   C-reactive protein    Specimen: Blood   Result Value Ref Range    C-Reactive Protein 0.04 0.00 - 0.50 mg/dL   MAXIMILIAN    Specimen: Blood   Result Value Ref Range    MAXIMILIAN Direct Positive (A) Negative   Uric acid    Specimen: Blood   Result Value Ref Range    Uric Acid 5.1 2.4 - 5.7 mg/dL   TSH    Specimen: Blood   Result Value Ref Range    TSH 1.830 0.270 - 4.200 uIU/mL   T4, Free    Specimen: Blood   Result Value Ref Range    Free T4 1.16 0.93 - 1.70 ng/dL   Hemoglobin A1c    Specimen: Blood   Result Value Ref Range    Hemoglobin A1C 5.60 4.80 - 5.60 %   Folate    Specimen: Blood   Result Value Ref Range    Folate 10.30 4.78 - 24.20 ng/mL   Ferritin    Specimen: Blood   Result Value Ref Range    Ferritin 13.70 13.00 - 150.00 ng/mL   Vitamin B12    Specimen: Blood   Result Value Ref Range    Vitamin B-12 308 211 - 946 pg/mL   Lipid Panel    Specimen: Blood   Result Value Ref Range    Total Cholesterol 170 0 - 200 mg/dL    Triglycerides 59 0 - 150 mg/dL    HDL Cholesterol 75 (H) 40 - 60 mg/dL    LDL Cholesterol  83 0 - 100 mg/dL    VLDL Cholesterol 12 5 - 40 mg/dL    LDL/HDL Ratio 1.11    Comprehensive Metabolic Panel    Specimen: Blood   Result Value Ref Range    Glucose 90 65 - 99 mg/dL    BUN 8 6 - 20 mg/dL    Creatinine 0.68 0.57 - 1.00 mg/dL    Sodium 137 136 - 145 mmol/L    Potassium 5.0 3.5 - 5.2 mmol/L    Chloride 100 98 - 107 mmol/L    CO2 26.5 22.0 - 29.0 mmol/L    Calcium 9.5 8.6 - 10.5 mg/dL    Total Protein 7.9 6.0 - 8.5 g/dL    Albumin 4.80 3.50 - 5.20 g/dL    ALT (SGPT) 8 1 - 33 U/L    AST (SGOT) 17 1 - 32 U/L    Alkaline Phosphatase 86 39 -  117 U/L    Total Bilirubin 0.2 0.0 - 1.2 mg/dL    eGFR  African Amer 129 >60 mL/min/1.73    Globulin 3.1 gm/dL    A/G Ratio 1.5 g/dL    BUN/Creatinine Ratio 11.8 7.0 - 25.0    Anion Gap 10.5 5.0 - 15.0 mmol/L   Results for orders placed or performed in visit on 04/02/19   Glia(IgA / G) & TTG(IgA / G)    Specimen: Blood   Result Value Ref Range    Gliadin Deamidated Peptide Ab, IgA 2 0 - 19 units    Deaminated Gliadin Ab IgG 3 0 - 19 units    Tissue Transglutaminase IgA <2 0 - 3 U/mL    Tissue Transglutaminase IgG <2 0 - 5 U/mL   Allergens (12) Foods    Specimen: Blood   Result Value Ref Range    Class Description Comment     Egg White <0.10 Class 0 kU/L    Milk, Cow's <0.10 Class 0 kU/L    CodFish <0.10 Class 0 kU/L    Sesame Seed 1.83 (A) Class III kU/L    Peanut <0.10 Class 0 kU/L    Soybean 0.19 (A) Class 0/I kU/L    Hazelnut 0.11 (A) Class 0/I kU/L    Shrimp 1.33 (A) Class II kU/L    Scallop <0.10 Class 0 kU/L    Gluten <0.10 Class 0 kU/L    Abercrombie <0.10 Class 0 kU/L    Wheat 0.30 (A) Class 0/I kU/L   CBC Auto Differential    Specimen: Blood   Result Value Ref Range    WBC 6.73 3.40 - 10.80 10*3/mm3    RBC 4.65 3.77 - 5.28 10*6/mm3    Hemoglobin 12.8 12.0 - 15.9 g/dL    Hematocrit 40.5 34.0 - 46.6 %    MCV 87.1 79.0 - 97.0 fL    MCH 27.5 26.6 - 33.0 pg    MCHC 31.6 31.5 - 35.7 g/dL    RDW 11.9 (L) 12.3 - 15.4 %    RDW-SD 38.2 37.0 - 54.0 fl    MPV 10.1 6.0 - 12.0 fL    Platelets 352 140 - 450 10*3/mm3    Neutrophil % 57.9 42.7 - 76.0 %    Lymphocyte % 30.2 19.6 - 45.3 %    Monocyte % 9.4 5.0 - 12.0 %    Eosinophil % 1.9 0.3 - 6.2 %    Basophil % 0.3 0.0 - 1.5 %    Immature Grans % 0.3 0.0 - 0.5 %    Neutrophils, Absolute 3.90 1.40 - 7.00 10*3/mm3    Lymphocytes, Absolute 2.03 0.70 - 3.10 10*3/mm3    Monocytes, Absolute 0.63 0.10 - 0.90 10*3/mm3    Eosinophils, Absolute 0.13 0.00 - 0.40 10*3/mm3    Basophils, Absolute 0.02 0.00 - 0.20 10*3/mm3    Immature Grans, Absolute 0.02 0.00 - 0.05 10*3/mm3    nRBC 0.0 0.0  - 0.0 /100 WBC     *Note: Due to a large number of results and/or encounters for the requested time period, some results have not been displayed. A complete set of results can be found in Results Review.

## 2021-04-01 NOTE — PATIENT INSTRUCTIONS
Abdominal Pain, Adult  Pain in the abdomen (abdominal pain) can be caused by many things. Often, abdominal pain is not serious and it gets better with no treatment or by being treated at home. However, sometimes abdominal pain is serious.  Your health care provider will ask questions about your medical history and do a physical exam to try to determine the cause of your abdominal pain.  Follow these instructions at home:    Medicines  · Take over-the-counter and prescription medicines only as told by your health care provider.  · Do not take a laxative unless told by your health care provider.  General instructions  · Watch your condition for any changes.  · Drink enough fluid to keep your urine pale yellow.  · Keep all follow-up visits as told by your health care provider. This is important.  Contact a health care provider if:  · Your abdominal pain changes or gets worse.  · You are not hungry or you lose weight without trying.  · You are constipated or have diarrhea for more than 2-3 days.  · You have pain when you urinate or have a bowel movement.  · Your abdominal pain wakes you up at night.  · Your pain gets worse with meals, after eating, or with certain foods.  · You are vomiting and cannot keep anything down.  · You have a fever.  · You have blood in your urine.  Get help right away if:  · Your pain does not go away as soon as your health care provider told you to expect.  · You cannot stop vomiting.  · Your pain is only in areas of the abdomen, such as the right side or the left lower portion of the abdomen. Pain on the right side could be caused by appendicitis.  · You have bloody or black stools, or stools that look like tar.  · You have severe pain, cramping, or bloating in your abdomen.  · You have signs of dehydration, such as:  ? Dark urine, very little urine, or no urine.  ? Cracked lips.  ? Dry mouth.  ? Sunken eyes.  ? Sleepiness.  ? Weakness.  · You have trouble breathing or chest  pain.  Summary  · Often, abdominal pain is not serious and it gets better with no treatment or by being treated at home. However, sometimes abdominal pain is serious.  · Watch your condition for any changes.  · Take over-the-counter and prescription medicines only as told by your health care provider.  · Contact a health care provider if your abdominal pain changes or gets worse.  · Get help right away if you have severe pain, cramping, or bloating in your abdomen.  This information is not intended to replace advice given to you by your health care provider. Make sure you discuss any questions you have with your health care provider.  Document Revised: 04/27/2020 Document Reviewed: 04/27/2020  [employer] Patient Education © 2021 [employer]

## 2021-04-05 NOTE — PROGRESS NOTES
Chief Complaint  Joint Pain (Follow from rheumatologist)    Subjective          Loren Sharif presents to [facility]  History of Present Illness     Pt is 24 yo female with management of iron deficiency, obesity, major depression, allergic rhinitis, prediabetes, vitamin D deficiency, former tobacco smoker. Multiple joint pain. MTHFR gene mutation postpartum depression. MAXIMILIAN positive,      2/27/21 in office visit for recheck on pt's above medical issues. Pt was referred to Rheumatology last visit due to being MAXIMILIAN positive and positive for RNP antibodies.and a family history of RA Pt continues to take her medicaitons for iron deficiency,vitamin D deficiency and major depression. She continues to have pain in her joints. She states that mostly her hands and knees hurt. She has tried to modify her diet and eat more probiotics and eat healthy. Certain foods like tomatoes and caffeine make her stomach feel more irritable. She also has epigastric tenderness and has not had EGD done before.    4/9/21 in office visit for recheck on pt's above medical issues. Pt saw GI on 4/1/21 for epigastric pain and has Endoscopy schedule on 4/21/21 with Dr. Leone. Pt continues to take her medications for Vitamin D deficiency, major depression, iron deficiency and multiple joint pain. Per patient she saw Rheumatology in . She was rested again and was positive for MAXIMILIAN and RF.  She was started on diclofenac and advised to take only as needed. Her next appt is in July 2021.   Depression is stable with zoloft she still has anxiety issues.  Per patient she may have early states of  RA      Abdominal Pain  This is a chronic problem. The current episode started more than 1 year ago. The onset quality is sudden. The problem occurs constantly. The problem has been unchanged. The pain is located in the epigastric region. The pain is at a severity of 4/10. The pain is moderate. The quality of the pain is  aching. The abdominal pain does not radiate. Associated symptoms include arthralgias, belching and frequency. Pertinent negatives include no anorexia, constipation, diarrhea, dysuria, fever, flatus, headaches, hematochezia, hematuria, melena, myalgias, vomiting or weight loss. The pain is aggravated by belching and eating. The pain is relieved by nothing. She has tried nothing for the symptoms. The treatment provided no relief. There is no history of abdominal surgery, colon cancer, Crohn's disease, gallstones, GERD, irritable bowel syndrome, pancreatitis, PUD or ulcerative colitis.   Knee Pain   The incident occurred more than 1 week ago. The incident occurred at work. There was no injury mechanism. The pain is present in the left knee and right knee. The quality of the pain is described as aching. The pain is at a severity of 4/10. The pain is moderate. The pain has been worsening since onset. Associated symptoms include muscle weakness and numbness. Pertinent negatives include no inability to bear weight, loss of motion, loss of sensation or tingling. The symptoms are aggravated by movement, palpation and weight bearing. She has tried voltaren  for the symptoms. The treatment provided mild relief.   Hand Pain   The incident occurred more than 1 week ago. There was no injury mechanism. The pain is present in the left hand and right hand. The quality of the pain is described as aching. The pain does not radiate. The pain is at a severity of 3/10. The pain is moderate. Associated symptoms include muscle weakness and numbness. Pertinent negatives include no chest pain or tingling. Nothing aggravates the symptoms. She has tried voltaren  for the symptoms. The treatment provided mild  relief.   Obesity   This is a chronic problem. The problem occurs constantly. The problem has been unchanged. Pertinent negatives include no abdominal pain, anorexia, arthralgias, change in bowel habit, chest  "pain, chills, congestion, coughing, diaphoresis, fatigue, fever, headaches, joint swelling, myalgias, nausea, neck pain, numbness, rash, sore throat, swollen glands, urinary symptoms, vertigo, visual change, vomiting or weakness. Nothing aggravates the symptoms. She has tried nothing for the symptoms. The treatment provided no relief.    Depression   Visit Type: follow-up  Patient presents with the following symptoms: anhedonia, decreased concentration, depressed mood, excessive worry, fatigue, feelings of hopelessness, feelings of worthlessness, irritability, memory impairment, nervousness/anxiety, panic, restlessness and weight gain.  Patient is not experiencing: chest pain, choking sensation, compulsions, confusion, dizziness, dry mouth, hypersomnia, hyperventilation, impotence, insomnia, malaise, muscle tension, nausea, obsessions, palpitations, psychomotor agitation, psychomotor retardation, shortness of breath, suicidal ideas, suicidal planning and thoughts of death.  Frequency of symptoms: constantly   Severity: moderate   Sleep quality: fair  -pt has tried  zoloft  whicih is improving     Objective   Vital Signs:   /86 (BP Location: Right arm, Patient Position: Sitting, Cuff Size: Adult)   Pulse 75   Temp 98.4 °F (36.9 °C) (Temporal)   Ht 170.2 cm (67\")   Wt 118 kg (259 lb 6.4 oz)   LMP 03/22/2021 (Exact Date)   SpO2 99%   BMI 40.63 kg/m²       Physical Exam  Vitals and nursing note reviewed.   Constitutional:       Appearance: She is well-developed. She is not diaphoretic.   HENT:      Head: Normocephalic and atraumatic.      Right Ear: External ear normal.   Eyes:      Conjunctiva/sclera: Conjunctivae normal.      Pupils: Pupils are equal, round, and reactive to light.   Cardiovascular:      Rate and Rhythm: Normal rate and regular rhythm.      Heart sounds: Normal heart sounds. No murmur heard.     Pulmonary:      Effort: Pulmonary effort is normal. No respiratory distress.      Breath " sounds: Normal breath sounds.   Abdominal:      General: Bowel sounds are normal. There is no distension.      Palpations: Abdomen is soft.      Tenderness: There is no abdominal tenderness.      Comments: Obese abdomen    Musculoskeletal:         General: Tenderness present. No deformity. Normal range of motion.      Cervical back: Normal range of motion and neck supple.   Skin:     General: Skin is warm.      Coloration: Skin is not pale.      Findings: No erythema or rash.   Neurological:      Mental Status: She is alert and oriented to person, place, and time.      Cranial Nerves: No cranial nerve deficit.   Psychiatric:         Behavior: Behavior normal.        Result Review :                 Assessment and Plan    Diagnoses and all orders for this visit:    1. Moderate episode of recurrent major depressive disorder (CMS/HCC) (Primary)  -     Ambulatory Referral to Behavioral Health    2. Current moderate episode of major depressive disorder without prior episode (CMS/HCC)    3. Prediabetes    4. Morbid obesity (CMS/HCC)    5. Multiple joint pain    6. JASKARAN (generalized anxiety disorder)  -     Ambulatory Referral to Behavioral Health    Other orders  -     hydrOXYzine pamoate (Vistaril) 25 MG capsule; Take 1 capsule by mouth 3 (Three) Times a Day As Needed for Itching.  Dispense: 30 capsule; Refill: 3            -went over labwork   -recommend influenza vaccination -pt declined   -hep C screening hep C antibody test   -recommend HPV vaccination  -pt declined   -elevated blood pressure continue to monitor   -GERD/epigastric pain -GI following Possible hiatal hernia/vs gastritis vs esophagitis.  has upcomign EGD   -MAXIMILIAN positive/multiple joint pain - referred to Rheuamtology on diclofenace   -prediabetes - prediabetes meal plan   -dyslipdiemia-  ASCVD risk low. Recommend DASH diet, heart healthy diet. .    -vitamin D deficiency -stable on vitamin D once aweek   -major depression/JASKARAN  - stale on zoloft 25 mg daily.   Recommend counseling with telehealth  will refer to [facility].  Will also start on vistaril 25 mg PO TID PRN. Drug information   -exercise induced asthma - was on albuterol  inhaler. singulair at bedtime   -morbid obesity -weight loss information provided.  Counseled >5 minutes BMI at 40.63   -advised pt to be safe and call with questions and concerns  -advised pt to go to ER or call 911 if symptoms worrisome or severe  -advised pt to followup with specialist and referrals  -advised pt to be safe during COVID-19 pandemic  I spent 25  minutes caring for Loren on this date of service. This time includes time spent by me in the following activities: preparing for the visit, reviewing tests, obtaining and/or reviewing a separately obtained history, performing a medically appropriate examination and/or evaluation, counseling and educating the patient/family/caregiver, ordering medications, tests, or procedures, referring and communicating with other health care professionals, documenting information in the medical record and care coordination.   -recheck in 3 months           This document has been electronically signed by Sandeep Rahman MD on April 9, 2021 10:04 CDT        Follow Up   Return in about 3 months (around 7/9/2021).  Patient was given instructions and counseling regarding her condition or for health maintenance advice. Please see specific information pulled into the AVS if appropriate.

## 2021-04-07 RX ORDER — SERTRALINE HYDROCHLORIDE 25 MG/1
25 TABLET, FILM COATED ORAL DAILY
Qty: 30 TABLET | Refills: 3 | Status: SHIPPED | OUTPATIENT
Start: 2021-04-07 | End: 2021-05-15 | Stop reason: SDUPTHER

## 2021-04-07 RX ORDER — FERROUS SULFATE 325(65) MG
325 TABLET ORAL
Qty: 30 TABLET | Refills: 3 | Status: SHIPPED | OUTPATIENT
Start: 2021-04-07 | End: 2021-05-15 | Stop reason: SDUPTHER

## 2021-04-07 RX ORDER — ERGOCALCIFEROL 1.25 MG/1
50000 CAPSULE ORAL
Qty: 4 CAPSULE | Refills: 3 | Status: SHIPPED | OUTPATIENT
Start: 2021-04-07 | End: 2021-05-15 | Stop reason: SDUPTHER

## 2021-04-09 ENCOUNTER — OFFICE VISIT (OUTPATIENT)
Dept: FAMILY MEDICINE CLINIC | Facility: CLINIC | Age: 26
End: 2021-04-09

## 2021-04-09 VITALS
WEIGHT: 259.4 LBS | BODY MASS INDEX: 40.71 KG/M2 | SYSTOLIC BLOOD PRESSURE: 124 MMHG | OXYGEN SATURATION: 99 % | TEMPERATURE: 98.4 F | DIASTOLIC BLOOD PRESSURE: 86 MMHG | HEART RATE: 75 BPM | HEIGHT: 67 IN

## 2021-04-09 DIAGNOSIS — F41.1 GAD (GENERALIZED ANXIETY DISORDER): ICD-10-CM

## 2021-04-09 DIAGNOSIS — F32.1 CURRENT MODERATE EPISODE OF MAJOR DEPRESSIVE DISORDER WITHOUT PRIOR EPISODE (HCC): ICD-10-CM

## 2021-04-09 DIAGNOSIS — E66.01 MORBID OBESITY (HCC): ICD-10-CM

## 2021-04-09 DIAGNOSIS — R73.03 PREDIABETES: ICD-10-CM

## 2021-04-09 DIAGNOSIS — F33.1 MODERATE EPISODE OF RECURRENT MAJOR DEPRESSIVE DISORDER (HCC): Primary | ICD-10-CM

## 2021-04-09 DIAGNOSIS — M25.50 MULTIPLE JOINT PAIN: ICD-10-CM

## 2021-04-09 PROCEDURE — 99214 OFFICE O/P EST MOD 30 MIN: CPT | Performed by: FAMILY MEDICINE

## 2021-04-09 RX ORDER — DICLOFENAC SODIUM 25 MG/1
25 TABLET, DELAYED RELEASE ORAL DAILY PRN
COMMUNITY
Start: 2021-04-06

## 2021-04-09 RX ORDER — HYDROXYZINE PAMOATE 25 MG/1
25 CAPSULE ORAL 3 TIMES DAILY PRN
Qty: 30 CAPSULE | Refills: 3 | Status: SHIPPED | OUTPATIENT
Start: 2021-04-09

## 2021-04-09 NOTE — PATIENT INSTRUCTIONS
New medication vistaril or hydroxyzine 25 mg as needed every 8 hours for anxiety      Hydroxyzine capsules or tablets  What is this medicine?  HYDROXYZINE (andra DROX i zeen) is an antihistamine. This medicine is used to treat allergy symptoms. It is also used to treat anxiety and tension. This medicine can be used with other medicines to induce sleep before surgery.  This medicine may be used for other purposes; ask your health care provider or pharmacist if you have questions.  COMMON BRAND NAME(S): ANX, Atarax, Rezine, Vistaril  What should I tell my health care provider before I take this medicine?  They need to know if you have any of these conditions:  · glaucoma  · heart disease  · history of irregular heartbeat  · kidney disease  · liver disease  · lung or breathing disease, like asthma  · stomach or intestine problems  · thyroid disease  · trouble passing urine  · an unusual or allergic reaction to hydroxyzine, cetirizine, other medicines, foods, dyes or preservatives  · pregnant or trying to get pregnant  · breast-feeding  How should I use this medicine?  Take this medicine by mouth with a full glass of water. Follow the directions on the prescription label. You may take this medicine with food or on an empty stomach. Take your medicine at regular intervals. Do not take your medicine more often than directed.  Talk to your pediatrician regarding the use of this medicine in children. Special care may be needed. While this drug may be prescribed for children as young as 6 years of age for selected conditions, precautions do apply.  Patients over 65 years old may have a stronger reaction and need a smaller dose.  Overdosage: If you think you have taken too much of this medicine contact a poison control center or emergency room at once.  NOTE: This medicine is only for you. Do not share this medicine with others.  What if I miss a dose?  If you miss a dose, take it as soon as you can. If it is almost time for  your next dose, take only that dose. Do not take double or extra doses.  What may interact with this medicine?  Do not take this medicine with any of the following medications:  · cisapride  · dronedarone  · pimozide  · thioridazine  This medicine may also interact with the following medications:  · alcohol  · antihistamines for allergy, cough, and cold  · atropine  · barbiturate medicines for sleep or seizures, like phenobarbital  · certain antibiotics like erythromycin or clarithromycin  · certain medicines for anxiety or sleep  · certain medicines for bladder problems like oxybutynin, tolterodine  · certain medicines for depression or psychotic disturbances  · certain medicines for irregular heart beat  · certain medicines for Parkinson's disease like benztropine, trihexyphenidyl  · certain medicines for seizures like phenobarbital, primidone  · certain medicines for stomach problems like dicyclomine, hyoscyamine  · certain medicines for travel sickness like scopolamine  · ipratropium  · narcotic medicines for pain  · other medicines that prolong the QT interval (an abnormal heart rhythm) like dofetilide  This list may not describe all possible interactions. Give your health care provider a list of all the medicines, herbs, non-prescription drugs, or dietary supplements you use. Also tell them if you smoke, drink alcohol, or use illegal drugs. Some items may interact with your medicine.  What should I watch for while using this medicine?  Tell your doctor or health care professional if your symptoms do not improve.  You may get drowsy or dizzy. Do not drive, use machinery, or do anything that needs mental alertness until you know how this medicine affects you. Do not stand or sit up quickly, especially if you are an older patient. This reduces the risk of dizzy or fainting spells. Alcohol may interfere with the effect of this medicine. Avoid alcoholic drinks.  Your mouth may get dry. Chewing sugarless gum or  sucking hard candy, and drinking plenty of water may help. Contact your doctor if the problem does not go away or is severe.  This medicine may cause dry eyes and blurred vision. If you wear contact lenses you may feel some discomfort. Lubricating drops may help. See your eye doctor if the problem does not go away or is severe.  If you are receiving skin tests for allergies, tell your doctor you are using this medicine.  What side effects may I notice from receiving this medicine?  Side effects that you should report to your doctor or health care professional as soon as possible:  · allergic reactions like skin rash, itching or hives, swelling of the face, lips, or tongue  · changes in vision  · confusion  · fast, irregular heartbeat  · seizures  · tremor  · trouble passing urine or change in the amount of urine  Side effects that usually do not require medical attention (report to your doctor or health care professional if they continue or are bothersome):  · constipation  · drowsiness  · dry mouth  · headache  · tiredness  This list may not describe all possible side effects. Call your doctor for medical advice about side effects. You may report side effects to FDA at 6-464-FDA-4254.  Where should I keep my medicine?  Keep out of the reach of children.  Store at room temperature between 15 and 30 degrees C (59 and 86 degrees F). Keep container tightly closed. Throw away any unused medicine after the expiration date.  NOTE: This sheet is a summary. It may not cover all possible information. If you have questions about this medicine, talk to your doctor, pharmacist, or health care provider.  © 2021 [employer] (2019-12-09 13:19:55)

## 2021-04-18 ENCOUNTER — LAB (OUTPATIENT)
Dept: LAB | Facility: HOSPITAL | Age: 26
End: 2021-04-18

## 2021-04-18 DIAGNOSIS — Z01.818 PREOP TESTING: Primary | ICD-10-CM

## 2021-04-18 LAB — SARS-COV-2 N GENE RESP QL NAA+PROBE: NOT DETECTED

## 2021-04-18 PROCEDURE — C9803 HOPD COVID-19 SPEC COLLECT: HCPCS

## 2021-04-18 PROCEDURE — 87635 SARS-COV-2 COVID-19 AMP PRB: CPT

## 2021-04-19 RX ORDER — POLYETHYLENE GLYCOL 3350, SODIUM SULFATE ANHYDROUS, SODIUM BICARBONATE, SODIUM CHLORIDE, POTASSIUM CHLORIDE 236; 22.74; 6.74; 5.86; 2.97 G/4L; G/4L; G/4L; G/4L; G/4L
4 POWDER, FOR SOLUTION ORAL ONCE
Qty: 400 ML | Refills: 0 | Status: SHIPPED | OUTPATIENT
Start: 2021-04-19 | End: 2021-04-19

## 2021-05-09 ENCOUNTER — LAB (OUTPATIENT)
Dept: LAB | Facility: HOSPITAL | Age: 26
End: 2021-05-09

## 2021-05-09 DIAGNOSIS — Z01.818 PREOP TESTING: Primary | ICD-10-CM

## 2021-05-09 LAB — SARS-COV-2 N GENE RESP QL NAA+PROBE: NOT DETECTED

## 2021-05-09 PROCEDURE — 87635 SARS-COV-2 COVID-19 AMP PRB: CPT

## 2021-05-09 PROCEDURE — C9803 HOPD COVID-19 SPEC COLLECT: HCPCS

## 2021-05-17 RX ORDER — SERTRALINE HYDROCHLORIDE 25 MG/1
25 TABLET, FILM COATED ORAL DAILY
Qty: 30 TABLET | Refills: 3 | Status: SHIPPED | OUTPATIENT
Start: 2021-05-17

## 2021-05-17 RX ORDER — FERROUS SULFATE 325(65) MG
325 TABLET ORAL
Qty: 30 TABLET | Refills: 3 | Status: SHIPPED | OUTPATIENT
Start: 2021-05-17

## 2021-05-17 RX ORDER — ERGOCALCIFEROL 1.25 MG/1
50000 CAPSULE ORAL
Qty: 4 CAPSULE | Refills: 3 | Status: SHIPPED | OUTPATIENT
Start: 2021-05-17

## 2021-07-30 ENCOUNTER — TELEPHONE (OUTPATIENT)
Dept: FAMILY MEDICINE CLINIC | Facility: CLINIC | Age: 26
End: 2021-07-30

## 2024-07-22 NOTE — TELEPHONE ENCOUNTER
Called patient to reschedule her appointment with Dr. Rahman. Left voicemail for patient to give office a call so we can get her on for another day.   
Yes